# Patient Record
Sex: FEMALE | Race: WHITE | NOT HISPANIC OR LATINO | Employment: OTHER | URBAN - METROPOLITAN AREA
[De-identification: names, ages, dates, MRNs, and addresses within clinical notes are randomized per-mention and may not be internally consistent; named-entity substitution may affect disease eponyms.]

---

## 2017-03-06 ENCOUNTER — OFFICE VISIT (OUTPATIENT)
Dept: FAMILY MEDICINE | Facility: CLINIC | Age: 67
End: 2017-03-06
Payer: COMMERCIAL

## 2017-03-06 VITALS
DIASTOLIC BLOOD PRESSURE: 70 MMHG | WEIGHT: 152 LBS | SYSTOLIC BLOOD PRESSURE: 120 MMHG | OXYGEN SATURATION: 97 % | RESPIRATION RATE: 16 BRPM | HEART RATE: 69 BPM | BODY MASS INDEX: 25.95 KG/M2 | HEIGHT: 64 IN | TEMPERATURE: 98.3 F

## 2017-03-06 DIAGNOSIS — H81.11 BPPV (BENIGN PAROXYSMAL POSITIONAL VERTIGO), RIGHT: Primary | ICD-10-CM

## 2017-03-06 PROCEDURE — 99213 OFFICE O/P EST LOW 20 MIN: CPT | Performed by: FAMILY MEDICINE

## 2017-03-06 RX ORDER — MECLIZINE HYDROCHLORIDE 25 MG/1
25 TABLET ORAL EVERY 6 HOURS PRN
Qty: 60 TABLET | Refills: 1 | Status: SHIPPED | OUTPATIENT
Start: 2017-03-06 | End: 2017-05-23

## 2017-03-06 NOTE — PATIENT INSTRUCTIONS
Benign Positional Vertigo    The inner ear is located behind the middle ear. It is a part of the balance center of the body. It contains small calcium particles within fluid filled canals (semi-circular canals). These particles can move out of position as a result of aging, head trauma or disease of the inner ear. Once that happens, movement of the head into certain positions may cause the particles to stimulate the inner ear and create the feeling of vertigo.  Vertigo is a false feeling of motion (as if you or the room is spinning). A vertigo attack may cause sudden nausea, vomiting and heavy sweating. Severe vertigo causes a loss of balance and may result in falling. During an attack of vertigo, head movement and body position changes will worsen symptoms.  An episode of vertigo may last seconds, minutes or hours. Once you are over the first episode of vertigo, it may never return. Sometimes symptoms recur off and on over several weeks or longer.  Home Care:    If symptoms are severe, rest quietly in bed. Change positions slowly. There is usually one position that will feel best, such as lying on one side or lying on your back with your head slightly raised on pillows.    Do not drive or work with dangerous machinery for one week after symptoms disappear, in case of a sudden return of symptoms.    Take medicine as prescribed to relieve your symptoms. Unless another medicine was prescribed for nausea, vomiting and vertigo, you may use over-the-counter motion sickness pills, such as meclizine (Bonine, Bonamine, Antivert) or dimenhydrinate (Dramamine).  Follow Up  with your doctor or as directed by our staff. Report any persistent ringing in the ear or hearing loss to your doctor.  [NOTE: If you had a CT or MRI scan, it will be reviewed by a specialist. You will be notified of any new findings that may affect your care.]  Get Prompt Medical Attention  if any of the following occur:    Worsening of vertigo not  controlled by the medicine prescribed    Repeated vomiting not controlled by the medicine prescribed    Increased weakness or fainting    Severe headache or unusual drowsiness or confusion    Weakness of an arm or leg or one side of the face    Difficulty with speech or vision    Seizure    2898-0199 [employer]. [address] [place] [address]. All rights reserved. This information is not intended as a substitute for professional medical care. Always follow your healthcare professional's instructions.

## 2017-03-06 NOTE — PROGRESS NOTES
"  SUBJECTIVE:                                                    Amelia Ozuna is a 66 year old female who presents to clinic today for the following health issues:      Follow up on vertigo, dizziness has returned along with some nausea and blurred vision, started up about 2 weeks ago following a bout with cold symptoms. exercises are not helping. Also will be traveling soon and would like something to calm while on flight.  It happened one year ago and she got some exercises that helped.    It also happened about 3 years ago.   She does not have any ringing in her ears.   She does have some muffling in both ears.   She does NOT have pressure or pain in her ears.   She does NOT have family hx of this.   She is going to [place] in 1 week for a month.       Past Medical History   Diagnosis Date     Leiomyoma of uterus, unspecified 10/2000       Past Surgical History   Procedure Laterality Date     C nonspecific procedure       Tonsillectomy     C nonspecific procedure       Oophorectomy benign serous cystadenoma (L)     C ligate fallopian tube,postpartum  1981     Tubal Ligation       MEDICATIONS:  Current Outpatient Prescriptions   Medication     SESAME OIL     No current facility-administered medications for this visit.        SOCIAL HISTORY:  Social History   Substance Use Topics     Smoking status: Never Smoker     Smokeless tobacco: Never Used     Alcohol use No       Family History   Problem Relation Age of Onset     Alcohol/Drug Father      Alzheimer Disease Mother 92     Neurologic Disorder Sister      3-one sister with hydrocephalous     Family History Negative Brother      3     Breast Cancer No family hx of      Cancer - colorectal No family hx of        Objective:  Blood pressure 120/70, pulse 69, temperature 98.3  F (36.8  C), temperature source Oral, resp. rate 16, height 5' 4\" (1.626 m), weight 152 lb (68.9 kg), SpO2 97 %, not currently breastfeeding.  HEENT:  TM's are clear bilaterally.  Oropharynx " is clear without tonsillar hypertrophy or exudate.  Conjuctiva are clear.  Nystagmus with right gaze  Chest: Clear to auscultation bilaterally.  No wheezes, rales or retractions.  CV: Regular rate and rhythm without murmurs, rubs or gallops.  Neck:  There is no lymphadenopathy or thyroid tenderness or enlargement    Assessment:  1. BPPV - think it is right ear - do not think Meniere's    Plan:  1. Meclizine prn  2. Handout on the above diagnosis was given to the patient and discussed  3. Balance referral if not better when back from [place]  4. Still has exercises to try from one year ago

## 2017-03-06 NOTE — MR AVS SNAPSHOT
After Visit Summary   3/6/2017    Amelia Ozuna    MRN: [id number]           Patient Information     Date Of Birth          1950        Visit Information        Provider Department      3/6/2017 2:15 PM Deisi Ortiz MD [facility]        Today's Diagnoses     BPPV (benign paroxysmal positional vertigo), right    -  1      Care Instructions      Benign Positional Vertigo    The inner ear is located behind the middle ear. It is a part of the balance center of the body. It contains small calcium particles within fluid filled canals (semi-circular canals). These particles can move out of position as a result of aging, head trauma or disease of the inner ear. Once that happens, movement of the head into certain positions may cause the particles to stimulate the inner ear and create the feeling of vertigo.  Vertigo is a false feeling of motion (as if you or the room is spinning). A vertigo attack may cause sudden nausea, vomiting and heavy sweating. Severe vertigo causes a loss of balance and may result in falling. During an attack of vertigo, head movement and body position changes will worsen symptoms.  An episode of vertigo may last seconds, minutes or hours. Once you are over the first episode of vertigo, it may never return. Sometimes symptoms recur off and on over several weeks or longer.  Home Care:    If symptoms are severe, rest quietly in bed. Change positions slowly. There is usually one position that will feel best, such as lying on one side or lying on your back with your head slightly raised on pillows.    Do not drive or work with dangerous machinery for one week after symptoms disappear, in case of a sudden return of symptoms.    Take medicine as prescribed to relieve your symptoms. Unless another medicine was prescribed for nausea, vomiting and vertigo, you may use over-the-counter motion sickness pills, such as meclizine (Bonine, Bonamine, Antivert) or  dimenhydrinate (Dramamine).  Follow Up  with your doctor or as directed by our staff. Report any persistent ringing in the ear or hearing loss to your doctor.  [NOTE: If you had a CT or MRI scan, it will be reviewed by a specialist. You will be notified of any new findings that may affect your care.]  Get Prompt Medical Attention  if any of the following occur:    Worsening of vertigo not controlled by the medicine prescribed    Repeated vomiting not controlled by the medicine prescribed    Increased weakness or fainting    Severe headache or unusual drowsiness or confusion    Weakness of an arm or leg or one side of the face    Difficulty with speech or vision    Seizure    1852-5963 [employer]. [address] [place] [address]. All rights reserved. This information is not intended as a substitute for professional medical care. Always follow your healthcare professional's instructions.              Follow-ups after your visit        Additional Services     AUDIOLOGY BALANCE REFERRAL       Your provider has referred you to: [facility] [phone] [email or web address]    Audiology Balance Referral (Comprehensive) includes Videonystagmography (VNG), Rotational Chair testing, and Computerized Dynamic Posturography.  You may also select individual tests from the list below.    Procedure(s): Audiology Balance Referral Comprehensive (includes Videonystagmography (VNG), Rotational Chair Testing and Computerized Dynamic Posturography)                  Who to contact     If you have questions or need follow up information about today's clinic visit or your schedule please contact [facility] directly at [phone].  Normal or non-critical lab and imaging results will be communicated to you by MyChart, letter or phone within 4 business days after the clinic has received the results. If you do not hear from us within 7  "days, please contact the clinic through [employer] or phone. If you have a critical or abnormal lab result, we will notify you by phone as soon as possible.  Submit refill requests through [employer] or call your pharmacy and they will forward the refill request to us. Please allow 3 business days for your refill to be completed.          Additional Information About Your Visit        [employer] Information     [employer] lets you send messages to your doctor, view your test results, renew your prescriptions, schedule appointments and more. To sign up, go to [email or web address] . Click on \"Log in\" on the left side of the screen, which will take you to the Welcome page. Then click on \"Sign up Now\" on the right side of the page.     You will be asked to enter the access code listed below, as well as some personal information. Please follow the directions to create your username and password.     Your access code is: [email or web address]  Expires: 2017  3:04 PM     Your access code will  in 90 days. If you need help or a new code, please call your [place] clinic or [phone].        Care EveryWhere ID     This is your Care EveryWhere ID. This could be used by other organizations to access your [place] medical records  [id number]        Your Vitals Were     Pulse Temperature Respirations Height Pulse Oximetry BMI (Body Mass Index)    69 98.3  F (36.8  C) (Oral) 16 5' 4\" (1.626 m) 97% 26.09 kg/m2       Blood Pressure from Last 3 Encounters:   17 120/70   16 120/70   16 120/75    Weight from Last 3 Encounters:   17 152 lb (68.9 kg)   16 155 lb (70.3 kg)   16 159 lb (72.1 kg)              We Performed the Following     AUDIOLOGY BALANCE REFERRAL          Today's Medication Changes          These changes are accurate as of: 3/6/17  3:05 PM.  If you have any questions, ask your nurse or doctor.               Start taking these medicines.        Dose/Directions    meclizine 25 MG tablet "   Commonly known as:  ANTIVERT   Used for:  BPPV (benign paroxysmal positional vertigo), right   Started by:  Deisi Ortiz MD        Dose:  25 mg   Take 1 tablet (25 mg) by mouth every 6 hours as needed for dizziness   Quantity:  60 tablet   Refills:  1            Where to get your medicines      These medications were sent to [facility] [id number] [place] [address] [facility] [address] [address] [facility] [address]     Phone:  [phone]     meclizine 25 MG tablet                Primary Care Provider    None Specified       No primary provider on file.        Thank you!     Thank you for choosing [facility]  for your care. Our goal is always to provide you with excellent care. Hearing back from our patients is one way we can continue to improve our services. Please take a few minutes to complete the written survey that you may receive in the mail after your visit with us. Thank you!             Your Updated Medication List - Protect others around you: Learn how to safely use, store and throw away your medicines at www.disposemymeds.org.          This list is accurate as of: 3/6/17  3:05 PM.  Always use your most recent med list.                   Brand Name Dispense Instructions for use    meclizine 25 MG tablet    ANTIVERT    60 tablet    Take 1 tablet (25 mg) by mouth every 6 hours as needed for dizziness       SESAME OIL      Once in a day

## 2017-03-06 NOTE — NURSING NOTE
"Chief Complaint   Patient presents with     Dizziness       Initial /70 (BP Location: Right arm, Patient Position: Chair, Cuff Size: Adult Regular)  Pulse 69  Temp 98.3  F (36.8  C) (Oral)  Resp 16  Ht 5' 4\" (1.626 m)  Wt 152 lb (68.9 kg)  SpO2 97%  BMI 26.09 kg/m2 Estimated body mass index is 26.09 kg/(m^2) as calculated from the following:    Height as of this encounter: 5' 4\" (1.626 m).    Weight as of this encounter: 152 lb (68.9 kg).  Medication Reconciliation: complete.Kyle VASQUEZ MA      "

## 2017-05-23 ENCOUNTER — OFFICE VISIT (OUTPATIENT)
Dept: FAMILY MEDICINE | Facility: CLINIC | Age: 67
End: 2017-05-23
Payer: COMMERCIAL

## 2017-05-23 VITALS
DIASTOLIC BLOOD PRESSURE: 77 MMHG | SYSTOLIC BLOOD PRESSURE: 127 MMHG | WEIGHT: 152 LBS | OXYGEN SATURATION: 98 % | HEIGHT: 64 IN | TEMPERATURE: 98.6 F | HEART RATE: 75 BPM | RESPIRATION RATE: 16 BRPM | BODY MASS INDEX: 25.95 KG/M2

## 2017-05-23 DIAGNOSIS — K29.00 ACUTE GASTRITIS, PRESENCE OF BLEEDING UNSPECIFIED, UNSPECIFIED GASTRITIS TYPE: Primary | ICD-10-CM

## 2017-05-23 DIAGNOSIS — F33.8 SEASONAL AFFECTIVE DISORDER (H): ICD-10-CM

## 2017-05-23 PROCEDURE — 99213 OFFICE O/P EST LOW 20 MIN: CPT | Performed by: PHYSICIAN ASSISTANT

## 2017-05-23 RX ORDER — OMEPRAZOLE 40 MG/1
40 CAPSULE, DELAYED RELEASE ORAL DAILY
Qty: 30 CAPSULE | Refills: 0 | Status: SHIPPED | OUTPATIENT
Start: 2017-05-23 | End: 2018-06-01

## 2017-05-23 RX ORDER — BUPROPION HYDROCHLORIDE 150 MG/1
150 TABLET ORAL EVERY MORNING
Qty: 30 TABLET | Refills: 5 | Status: SHIPPED | OUTPATIENT
Start: 2017-05-23 | End: 2018-06-01

## 2017-05-23 NOTE — NURSING NOTE
"Chief Complaint   Patient presents with     Abdominal Pain     epigastric pain, patient states that anytime she takes medication she gets pain in epigastric region. Pain for the past month.       Initial /77 (BP Location: Right arm, Patient Position: Chair, Cuff Size: Adult Regular)  Pulse 75  Temp 98.6  F (37  C) (Oral)  Resp 16  Ht 5' 4\" (1.626 m)  Wt 152 lb (68.9 kg)  SpO2 98%  BMI 26.09 kg/m2 Estimated body mass index is 26.09 kg/(m^2) as calculated from the following:    Height as of this encounter: 5' 4\" (1.626 m).    Weight as of this encounter: 152 lb (68.9 kg).  Medication Reconciliation: complete     Ivy Mandel CMA      "

## 2017-05-23 NOTE — PROGRESS NOTES
SUBJECTIVE:                                                    Amelia Ozuna is a 66 year old female who presents to clinic today for the following health issues:      ABDOMINAL PAIN     Onset: month    Description:   Character: Sharp  Location: epigastric region  Radiation: None    Intensity: mild    Progression of Symptoms:  same    Accompanying Signs & Symptoms:  Fever/Chills?: no   Gas/Bloating: YES  Nausea: YES- but it has resolved  Vomitting: YES- but it has resolved  Diarrhea?: no   Constipation:no   Dysuria or Hematuria: no   Blood in stool: no   History:   Trauma: no   Previous similar pain: YES   Previous tests done: Upper Endoscopy  Symptoms started after taking otc nsaids after hurting back in [place].     Precipitating factors:   Does the pain change with:     Food: YES- sometimes, but not all the time     BM: no     Urination: no     Alleviating factors:  Pt reports pain is worse after taking any type of medications.    Therapies Tried and outcome: none    LMP:  not applicable       Of note, patient also has history of seasonal affective disordered managed with wellbutrin 150 mg daily during the winter months. She is requesting a refill of this. No side effects. Manages symptoms well.     Problem list and histories reviewed & adjusted, as indicated.  Additional history: as documented    Patient Active Problem List   Diagnosis     GERD (gastroesophageal reflux disease)     Hypercholesterolemia     HYPERLIPIDEMIA LDL GOAL <130     Advanced directives, counseling/discussion     Eyelid edema     Seasonal affective disorder (H)     Onychomycosis     Adjustment disorder with mixed emotional features     BPPV (benign paroxysmal positional vertigo)     Past Surgical History:   Procedure Laterality Date     C LIGATE FALLOPIAN TUBE,POSTPARTUM  1981    Tubal Ligation     C NONSPECIFIC PROCEDURE      Tonsillectomy     C NONSPECIFIC PROCEDURE      Oophorectomy benign serous cystadenoma (L)       Social History  "  Substance Use Topics     Smoking status: Never Smoker     Smokeless tobacco: Never Used     Alcohol use No     Family History   Problem Relation Age of Onset     Alcohol/Drug Father      Alzheimer Disease Mother 92     Neurologic Disorder Sister      3-one sister with hydrocephalous     Family History Negative Brother      3     Breast Cancer No family hx of      Cancer - colorectal No family hx of          Current Outpatient Prescriptions   Medication Sig Dispense Refill     omeprazole (PRILOSEC) 40 MG capsule Take 1 capsule (40 mg) by mouth daily Take 30-60 minutes before a meal. 30 capsule 0     buPROPion (WELLBUTRIN XL) 150 MG 24 hr tablet Take 1 tablet (150 mg) by mouth every morning 30 tablet 5     SESAME OIL Once in a day       Allergies   Allergen Reactions     No Known Drug Allergies      BP Readings from Last 3 Encounters:   05/23/17 127/77   03/06/17 120/70   12/05/16 120/70    Wt Readings from Last 3 Encounters:   05/23/17 152 lb (68.9 kg)   03/06/17 152 lb (68.9 kg)   12/05/16 155 lb (70.3 kg)                    Reviewed and updated as needed this visit by clinical staff  Tobacco  Allergies  Meds  Problems  Med Hx  Surg Hx  Fam Hx  Soc Hx        Reviewed and updated as needed this visit by Provider  Allergies  Meds  Problems         ROS:  Constitutional, HEENT, cardiovascular, pulmonary, gi and gu systems are negative, except as otherwise noted.    OBJECTIVE:                                                    /77 (BP Location: Right arm, Patient Position: Chair, Cuff Size: Adult Regular)  Pulse 75  Temp 98.6  F (37  C) (Oral)  Resp 16  Ht 5' 4\" (1.626 m)  Wt 152 lb (68.9 kg)  SpO2 98%  BMI 26.09 kg/m2  Body mass index is 26.09 kg/(m^2).  GENERAL: healthy, alert and no distress  RESP: lungs clear to auscultation - no rales, rhonchi or wheezes  CV: regular rates and rhythm, normal S1 S2, no S3 or S4 and no murmur, click or rub  ABDOMEN: tenderness epigastric, no organomegaly or " masses, liver span normal to percussion, bowel sounds normal and no palpable or pulsatile masses  PSYCH: mentation appears normal, affect normal/bright    Diagnostic Test Results:  none      ASSESSMENT/PLAN:                                                      (K29.00) Acute gastritis, presence of bleeding unspecified, unspecified gastritis type  (primary encounter diagnosis)  Comment: history of GERD. This with past normal EGD and recent nsaid exposure, likely benign gastritis. No history or exam findings suggesting significant PUD. No evidence of upper GI bleed at this time. h pylori was considered given recent travel, but with symptoms occurring after nsaid use, felt less likely. If symptoms fail to improve in 2 weeks, will obtain EGD given age. If worsening, patient should RTC.   Plan: omeprazole (PRILOSEC) 40 MG capsule        -Medication use and side effects discussed with the patient. Patient is in complete understanding and agreement with plan.       (F39) Seasonal affective disorder (H)  Comment: history of this. Requesting refill. Tolerates well. Takes only during winter. No side effects.   Plan: buPROPion (WELLBUTRIN XL) 150 MG 24 hr tablet        -Medication use and side effects discussed with the patient. Patient is in complete understanding and agreement with plan.         Follow up: as above     Calvin Fitzpatrick PA-C  [facility]

## 2017-05-23 NOTE — PATIENT INSTRUCTIONS
Gastritis (Adult)    Gastritis is inflammation and irritation of the stomach lining. It can be present for a short time (acute) or be long lasting (chronic). Gastritis is often caused by infection with bacteria called H pylori. More than a third of people in the [place] have this bacteria in their bodies. In many cases, H pylori causes no problems or symptoms. In some people, though, the infection irritates the stomach lining and causes gastritis. Other causes of stomach irritation include drinking alcohol or taking pain-relieving medicines called NSAIDs (such as aspirin or ibuprofen).   Symptoms of gastritis can include:    Abdominal pain or bloating    Loss of appetite    Nausea or vomiting    Vomiting blood or having black stools    Feeling more tired than usual  An inflamed and irritated stomach lining is more likely to develop a sore called an ulcer. To help prevent this, gastritis should be treated.  Home care  If needed, medicines may be prescribed. If you have H pylori infection, treating it will likely relieve your symptoms. Other changes can help reduce stomach irritation and help it heal.    If you have been prescribed medicines for H pylori infection, take them as directed. Take all of the medicine until it is finished or your healthcare provider tells you to stop, even if you feel better.    Your healthcare provider may recommend avoiding NSAIDs. If you take daily aspirin for your heart or other medical reasons, do not stop without talking to your healthcare provider first.    Avoid drinking alcohol.    Stop smoking. Smoking can irritate the stomach and delay healing. As much as possible, stay away from second hand smoke.  Follow-up care  Follow up with your healthcare provider, or as advised by our staff. Testing may be needed to check for inflammation or an ulcer.  When to seek medical advice  Call your healthcare provider for any of the following:    Stomach pain that gets worse or moves to the lower  right abdomen (appendix area)    Chest pain that appears or gets worse, or spreads to the back, neck, shoulder, or arm    Frequent vomiting (can t keep down liquids)    Blood in the stool or vomit (red or black in color)    Feeling weak or dizzy    Fever of 100.4 F (38 C) or higher, or as directed by your healthcare provider    9227-0206 [employer]. [address] [place] [address]. All rights reserved. This information is not intended as a substitute for professional medical care. Always follow your healthcare professional's instructions.

## 2017-05-23 NOTE — MR AVS SNAPSHOT
After Visit Summary   5/23/2017    Amelia Ozuna    MRN: [id number]           Patient Information     Date Of Birth          1950        Visit Information        Provider Department      5/23/2017 11:00 AM Calvin Fitzpatrick PA-C [facility]        Today's Diagnoses     Acute gastritis, presence of bleeding unspecified, unspecified gastritis type    -  1      Care Instructions      Gastritis (Adult)    Gastritis is inflammation and irritation of the stomach lining. It can be present for a short time (acute) or be long lasting (chronic). Gastritis is often caused by infection with bacteria called H pylori. More than a third of people in the  have this bacteria in their bodies. In many cases, H pylori causes no problems or symptoms. In some people, though, the infection irritates the stomach lining and causes gastritis. Other causes of stomach irritation include drinking alcohol or taking pain-relieving medicines called NSAIDs (such as aspirin or ibuprofen).   Symptoms of gastritis can include:    Abdominal pain or bloating    Loss of appetite    Nausea or vomiting    Vomiting blood or having black stools    Feeling more tired than usual  An inflamed and irritated stomach lining is more likely to develop a sore called an ulcer. To help prevent this, gastritis should be treated.  Home care  If needed, medicines may be prescribed. If you have H pylori infection, treating it will likely relieve your symptoms. Other changes can help reduce stomach irritation and help it heal.    If you have been prescribed medicines for H pylori infection, take them as directed. Take all of the medicine until it is finished or your healthcare provider tells you to stop, even if you feel better.    Your healthcare provider may recommend avoiding NSAIDs. If you take daily aspirin for your heart or other medical reasons, do not stop without talking to your healthcare provider first.    Avoid  drinking alcohol.    Stop smoking. Smoking can irritate the stomach and delay healing. As much as possible, stay away from second hand smoke.  Follow-up care  Follow up with your healthcare provider, or as advised by our staff. Testing may be needed to check for inflammation or an ulcer.  When to seek medical advice  Call your healthcare provider for any of the following:    Stomach pain that gets worse or moves to the lower right abdomen (appendix area)    Chest pain that appears or gets worse, or spreads to the back, neck, shoulder, or arm    Frequent vomiting (can t keep down liquids)    Blood in the stool or vomit (red or black in color)    Feeling weak or dizzy    Fever of 100.4 F (38 C) or higher, or as directed by your healthcare provider    5927-0933 [employer] [address] [place] [address]. All rights reserved. This information is not intended as a substitute for professional medical care. Always follow your healthcare professional's instructions.              Follow-ups after your visit        Who to contact     If you have questions or need follow up information about today's clinic visit or your schedule please contact [facility] directly at [phone].  Normal or non-critical lab and imaging results will be communicated to you by [employer]hart, letter or phone within 4 business days after the clinic has received the results. If you do not hear from us within 7 days, please contact the clinic through [employer]hart or phone. If you have a critical or abnormal lab result, we will notify you by phone as soon as possible.  Submit refill requests through [employer] or call your pharmacy and they will forward the refill request to us. Please allow 3 business days for your refill to be completed.          Additional Information About Your Visit        [employer]hart Information     [employer] lets you send messages to your doctor, view your test results, renew your prescriptions,  "schedule appointments and more. To sign up, go to [email or web address] . Click on \"Log in\" on the left side of the screen, which will take you to the Welcome page. Then click on \"Sign up Now\" on the right side of the page.     You will be asked to enter the access code listed below, as well as some personal information. Please follow the directions to create your username and password.     Your access code is: [email or web address]  Expires: 2017  4:04 PM     Your access code will  in 90 days. If you need help or a new code, please call your [place] clinic or [phone].        Care EveryWhere ID     This is your Care EveryWhere ID. This could be used by other organizations to access your [place] medical records  [id number]        Your Vitals Were     Pulse Temperature Respirations Height Pulse Oximetry BMI (Body Mass Index)    75 98.6  F (37  C) (Oral) 16 5' 4\" (1.626 m) 98% 26.09 kg/m2       Blood Pressure from Last 3 Encounters:   17 127/77   17 120/70   16 120/70    Weight from Last 3 Encounters:   17 152 lb (68.9 kg)   17 152 lb (68.9 kg)   16 155 lb (70.3 kg)              Today, you had the following     No orders found for display         Today's Medication Changes          These changes are accurate as of: 17 11:16 AM.  If you have any questions, ask your nurse or doctor.               Start taking these medicines.        Dose/Directions    omeprazole 40 MG capsule   Commonly known as:  priLOSEC   Used for:  Acute gastritis, presence of bleeding unspecified, unspecified gastritis type   Started by:  Calvin Fitzpatrick PA-C        Dose:  40 mg   Take 1 capsule (40 mg) by mouth daily Take 30-60 minutes before a meal.   Quantity:  30 capsule   Refills:  0            Where to get your medicines      Some of these will need a paper prescription and others can be bought over the counter.  Ask your nurse if you have questions.     Bring a paper prescription for " each of these medications     omeprazole 40 MG capsule                Primary Care Provider    None Specified       No primary provider on file.        Thank you!     Thank you for choosing [facility]  for your care. Our goal is always to provide you with excellent care. Hearing back from our patients is one way we can continue to improve our services. Please take a few minutes to complete the written survey that you may receive in the mail after your visit with us. Thank you!             Your Updated Medication List - Protect others around you: Learn how to safely use, store and throw away your medicines at www.disposemymeds.org.          This list is accurate as of: 5/23/17 11:16 AM.  Always use your most recent med list.                   Brand Name Dispense Instructions for use    omeprazole 40 MG capsule    priLOSEC    30 capsule    Take 1 capsule (40 mg) by mouth daily Take 30-60 minutes before a meal.       SESAME OIL      Once in a day

## 2017-09-11 ENCOUNTER — TRANSFERRED RECORDS (OUTPATIENT)
Dept: HEALTH INFORMATION MANAGEMENT | Facility: CLINIC | Age: 67
End: 2017-09-11

## 2017-11-24 ENCOUNTER — OFFICE VISIT (OUTPATIENT)
Dept: PODIATRY | Facility: CLINIC | Age: 67
End: 2017-11-24
Payer: COMMERCIAL

## 2017-11-24 VITALS
WEIGHT: 152 LBS | BODY MASS INDEX: 25.95 KG/M2 | SYSTOLIC BLOOD PRESSURE: 122 MMHG | DIASTOLIC BLOOD PRESSURE: 72 MMHG | HEIGHT: 64 IN

## 2017-11-24 DIAGNOSIS — L60.0 ONYCHOCRYPTOSIS: Primary | ICD-10-CM

## 2017-11-24 DIAGNOSIS — B35.1 ONYCHOMYCOSIS: ICD-10-CM

## 2017-11-24 PROCEDURE — 11730 AVULSION NAIL PLATE SIMPLE 1: CPT | Mod: T5 | Performed by: PODIATRIST

## 2017-11-24 PROCEDURE — 99203 OFFICE O/P NEW LOW 30 MIN: CPT | Mod: 25 | Performed by: PODIATRIST

## 2017-11-24 NOTE — PATIENT INSTRUCTIONS
Thank you for choosing [facility]!    DR. DELONG'S CLINIC LOCATIONS:   MONDAY - [place] TUESDAY - [place]   [address] [facility]  [address] [facility] [address]   [place] [address] [place] [address]   [phone]       THURSDAY AM - [place] THURSDAY PM - [place]   [address]   [place] [address] [place] [address]   [phone]       FRIDAY AM - [place] SET UP SURGERY: [phone] [address] APPOINTMENTS: [phone]   [place] [address] BILLING QUESTIONS: [phone] FAX NUMBER: [phone]     [facility] TOENAIL REMOVAL HOME INSTRUCTIONS  1. After the procedure, go home and elevate the foot/feet for the remainder of the day/evening as able. This is to minimize swelling, control pain, and limit post-procedural complications. The pre-procedural injection may cause your toe to be numb anywhere from 1-2 hours.    2. You can take Tylenol, Ibuprofen, Advil, etc as needed for pain if tolerated. Follow label instructions.     3. If you have been given a prescription for antibiotics, take them as instructed and complete the entire prescription.    4. Keep dressing intact until the following morning. Then remove the bandage (you may need to soak it in warm soapy water as the bandage will likely adhere to your skin).    5. Start soaking in warm soapy water for 5-10 minutes twice a day. Wash the toe thoroughly, dry the toe thoroughly. Apply antibiotic wound ointment to base of wound and cover with gauze and Coban dressing (not too tightly) until it stops draining. This may take a few days to weeks, but at that point, you may continue with antibiotic ointment and a band-aid, or you may stop applying a dressing all together. Dressing changes should be done twice daily if you had the permanent/chemical procedure done.    6. You may do activities as tolerated the following day. Find a shoe that is  comfortable and minimizes the amount of rubbing on your toe, as this may increase pain, swelling, etc.    7. Monitor for signs of infection. With this procedure, it is common to have mild surrounding redness and drainage. If the redness involves the entire great toe or if you notice red streaks on top of your foot, or if you experience any nausea, vomiting, chills, fevers > 101 degrees, call clinic for a quick appointment.        Body Mass Index (BMI)  Many things can cause foot and ankle problems. Foot structure, activity level, foot mechanics and injuries are common causes of pain.  One very important issue that often goes unmentioned, is body weight.  Extra weight can cause increased stress on muscles, ligaments, bones and tendons.  Sometimes just a few extra pounds is all it takes to put one over her/his threshold. Without reducing that stress, it can be difficult to alleviate pain. Some people are uncomfortable addressing this issue, but we feel it is important for you to think about it. As Foot &  Ankle specialists, our job is addressing the lower extremity problem and possible causes. Regarding extra body weight, we encourage patients to discuss diet and weight management plans with their primary care doctors. It is this team approach that gives you the best opportunity for pain relief and getting you back on your feet.

## 2017-11-24 NOTE — MR AVS SNAPSHOT
After Visit Summary   11/24/2017    Amelia Ozuna    MRN: [id number]           Patient Information     Date Of Birth          1950        Visit Information        Provider Department      11/24/2017 10:00 AM Jaden Delong DPM [facility]        Care Instructions    Thank you for choosing [facility]!    DR. DELONG'S CLINIC LOCATIONS:   MONDAY - [place] TUESDAY - [place]   [address] [facility]  [address]   [place] [address] [place] [address]   [phone]       THURSDAY AM - [place] THURSDAY PM - [place]   [address]   [place] [address] [place] [address]   [phone]       FRIDAY AM - [place] SET UP SURGERY: [phone] [address] APPOINTMENTS: [phone]   [place] [address] BILLING QUESTIONS: [phone] FAX NUMBER: [phone]     INGROWN TOENAIL REMOVAL HOME INSTRUCTIONS  1. After the procedure, go home and elevate the foot/feet for the remainder of the day/evening as able. This is to minimize swelling, control pain, and limit post-procedural complications. The pre-procedural injection may cause your toe to be numb anywhere from 1-2 hours.    2. You can take Tylenol, Ibuprofen, Advil, etc as needed for pain if tolerated. Follow label instructions.     3. If you have been given a prescription for antibiotics, take them as instructed and complete the entire prescription.    4. Keep dressing intact until the following morning. Then remove the bandage (you may need to soak it in warm soapy water as the bandage will likely adhere to your skin).    5. Start soaking in warm soapy water for 5-10 minutes twice a day. Wash the toe thoroughly, dry the toe thoroughly. Apply antibiotic wound ointment to base of wound and cover with gauze and Coban dressing (not too tightly) until it stops draining. This may take a few days to  weeks, but at that point, you may continue with antibiotic ointment and a band-aid, or you may stop applying a dressing all together. Dressing changes should be done twice daily if you had the permanent/chemical procedure done.    6. You may do activities as tolerated the following day. Find a shoe that is comfortable and minimizes the amount of rubbing on your toe, as this may increase pain, swelling, etc.    7. Monitor for signs of infection. With this procedure, it is common to have mild surrounding redness and drainage. If the redness involves the entire great toe or if you notice red streaks on top of your foot, or if you experience any nausea, vomiting, chills, fevers > 101 degrees, call clinic for a quick appointment.        Body Mass Index (BMI)  Many things can cause foot and ankle problems. Foot structure, activity level, foot mechanics and injuries are common causes of pain.  One very important issue that often goes unmentioned, is body weight.  Extra weight can cause increased stress on muscles, ligaments, bones and tendons.  Sometimes just a few extra pounds is all it takes to put one over her/his threshold. Without reducing that stress, it can be difficult to alleviate pain. Some people are uncomfortable addressing this issue, but we feel it is important for you to think about it. As Foot &  Ankle specialists, our job is addressing the lower extremity problem and possible causes. Regarding extra body weight, we encourage patients to discuss diet and weight management plans with their primary care doctors. It is this team approach that gives you the best opportunity for pain relief and getting you back on your feet.              Follow-ups after your visit        Who to contact     If you have questions or need follow up information about today's clinic visit or your schedule please contact [facility] directly at [phone].  Normal or non-critical lab and imaging results will be  "communicated to you by MyChart, letter or phone within 4 business days after the clinic has received the results. If you do not hear from us within 7 days, please contact the clinic through [employer] or phone. If you have a critical or abnormal lab result, we will notify you by phone as soon as possible.  Submit refill requests through [employer] or call your pharmacy and they will forward the refill request to us. Please allow 3 business days for your refill to be completed.          Additional Information About Your Visit        [employer] Information     [employer] lets you send messages to your doctor, view your test results, renew your prescriptions, schedule appointments and more. To sign up, go to [email or web address] . Click on \"Log in\" on the left side of the screen, which will take you to the Welcome page. Then click on \"Sign up Now\" on the right side of the page.     You will be asked to enter the access code listed below, as well as some personal information. Please follow the directions to create your username and password.     Your access code is: [email or web address]  Expires: 2018 10:20 AM     Your access code will  in 90 days. If you need help or a new code, please call your [place] clinic or [phone].        Care EveryWhere ID     This is your Care EveryWhere ID. This could be used by other organizations to access your [place] medical records  [id number]        Your Vitals Were     Height BMI (Body Mass Index)                5' 4\" (1.626 m) 26.09 kg/m2           Blood Pressure from Last 3 Encounters:   17 127/77   17 120/70   16 120/70    Weight from Last 3 Encounters:   17 152 lb (68.9 kg)   17 152 lb (68.9 kg)   17 152 lb (68.9 kg)              Today, you had the following     No orders found for display       Primary Care Provider    None Specified       No primary provider on file.        Equal Access to Services     ALFONSO GARRISON AH: Sara blanco " [place], gautam ludinorahadaha, qarosata kafreida thao, eliana shanein hayaan aglindojose vijayamanda laAviavflorencio wilian. So [facility] [phone].    ATENCIÓN: Si irinala mimi, tiene a allan disposición servicios gratuitos de asistencia lingüística. Joshua al [phone].    We comply with applicable federal civil rights laws and [place] laws. We do not discriminate on the basis of race, color, national origin, age, disability, sex, sexual orientation, or gender identity.            Thank you!     Thank you for choosing [facility]  for your care. Our goal is always to provide you with excellent care. Hearing back from our patients is one way we can continue to improve our services. Please take a few minutes to complete the written survey that you may receive in the mail after your visit with us. Thank you!             Your Updated Medication List - Protect others around you: Learn how to safely use, store and throw away your medicines at www.disposemymeds.org.          This list is accurate as of: 11/24/17 10:20 AM.  Always use your most recent med list.                   Brand Name Dispense Instructions for use Diagnosis    buPROPion 150 MG 24 hr tablet    WELLBUTRIN XL    30 tablet    Take 1 tablet (150 mg) by mouth every morning    Seasonal affective disorder (H)       omeprazole 40 MG capsule    priLOSEC    30 capsule    Take 1 capsule (40 mg) by mouth daily Take 30-60 minutes before a meal.    Acute gastritis, presence of bleeding unspecified, unspecified gastritis type       SESAME OIL      Once in a day

## 2017-11-24 NOTE — NURSING NOTE
"Chief Complaint   Patient presents with     Ingrown Toenail     R hallux BL edges x years       Initial /72  Ht 5' 4\" (1.626 m)  Wt 152 lb (68.9 kg)  BMI 26.09 kg/m2 Estimated body mass index is 26.09 kg/(m^2) as calculated from the following:    Height as of this encounter: 5' 4\" (1.626 m).    Weight as of this encounter: 152 lb (68.9 kg).  Medication Reconciliation: complete    Fidelia Brooks CMA (AAMA)  Podiatry / Foot & Ankle Surgery  [facility]    "

## 2017-11-24 NOTE — PROGRESS NOTES
"Foot & Ankle Surgery  November 24, 2017    CC: R hallux pain    I was asked to see Amelia Ozuna regarding the chief complaint by:  self    HPI:  Pt is a 67 year old female who presents with above complaint.  R hallux nail pain x \"few\" years.  Does not recall injury.  Pain 3-6/10 daily, worse with shoe gear pressure.  No treatment noted.  Describes shooting pain.  Thinks she may have a fungus and that the fungus is causing the issue.    ROS:   Pos for CC.  The patient denies current nausea, vomiting, chills, fevers, belly pain, calf pain, chest pain or SOB.  Complete remainder of ROS is otherwise neg.    VITALS:  There were no vitals filed for this visit.    PMH:    Past Medical History:   Diagnosis Date     Leiomyoma of uterus, unspecified 10/2000       SXHX:    Past Surgical History:   Procedure Laterality Date     C LIGATE FALLOPIAN TUBE,POSTPARTUM  1981    Tubal Ligation     C NONSPECIFIC PROCEDURE      Tonsillectomy     C NONSPECIFIC PROCEDURE      Oophorectomy benign serous cystadenoma (L)        MEDS:    Current Outpatient Prescriptions   Medication     omeprazole (PRILOSEC) 40 MG capsule     buPROPion (WELLBUTRIN XL) 150 MG 24 hr tablet     SESAME OIL     No current facility-administered medications for this visit.        ALL:     Allergies   Allergen Reactions     No Known Drug Allergies        FMH:    Family History   Problem Relation Age of Onset     Alcohol/Drug Father      Alzheimer Disease Mother 92     Neurologic Disorder Sister      3-one sister with hydrocephalous     Family History Negative Brother      3     Breast Cancer No family hx of      Cancer - colorectal No family hx of        SocHx:    Social History     Social History     Marital status:      Spouse name: N/A     Number of children: N/A     Years of education: N/A     Occupational History     Not on file.     Social History Main Topics     Smoking status: Never Smoker     Smokeless tobacco: Never Used     Alcohol use No     " "Drug use: No     Sexual activity: Yes     Partners: Male     Birth control/ protection: Surgical      Comment: menopausal      Other Topics Concern     Parent/Sibling W/ Cabg, Mi Or Angioplasty Before 65f 55m? No     Social History Narrative           EXAMINATION:  Gen:   No apparent distress  Neuro:   A&Ox3, no deficits  Psych:    Answering questions appropriately for age and situation with normal affect  Head:    NCAT  Eye:    Visual scanning without deficit  Ear:    Response to auditory stimuli wnl  Lung:    Non-labored breathing on RA noted  Abd:    NTND per patient report  Lymph:    Neg for pitting/non-pitting edema BLE  Vasc:    Pulses palpable, CFT minimally delayed  Neuro:    Light touch sensation intact to all sensory nerve distributions without paresthesias  Derm:   R hallux nail quite incurvated latera > medial border without paronychia/infection.  Centrally, the nail is thickened, dystrophic, mycotic, although there is \"normal\" nail proximally, so hopefully the infection has not spread to the nail matrix.  MSK:    ROM, strength wnl without limitation, no pain on palpation noted.  Calf:    Neg for redness, swelling or tenderness    Assessment:  67 year old female with onychocryptosis bilateral border R hallux nail; onychomycosis R hallux      Plan:  Discussed etiologies and options  1.  Onychocryptosis bilateral border R hallux  -Regarding the ingrown nail, procedure options were discussed.  They elected to go with Total temporary avulsion.  See procedure note for details.  Risks that were discussed include but are not limited to infection, wound healing complications, nerve irritation, recurrence of the ingrown nail and the need for further procedures.  Follow up 2 weeks for re-evaluation or sooner with acute issues.  Antibiotic:  None needed  -discussed that the nail is very likely to grow back the same way. However, she would like to try temporary avulsion to try to save the nail.    After discussing " the procedure, as well as risks, complications and post-procedure instructions, informed consent was obtained.    Anesthesia:  4 cc's of  1% lidocaine plain    Procedure:  After adequate prep, and with anesthesia achieved,  attention was directed to the R hallux where the nail plate was freed from surrounding soft tissue and then removed in total.  The base of the wound was explored and showed no necrotic tissue, purulence or debris.   A clean dressing was applied loosely to prevent vascular insult.  The patient tolerated the procedure well without complications.    Post-procedural instructions were dispensed and discussed with the patient.  All questions were answered.         2.  Onychomycosis   -discussed that in many cases, the fungus may return. However, if there is proximal clear nail, and the matrix is not infected, we may see clearing of the fungus with a temporary avulsion    Follow up:  Prn after procedure or sooner with acute issues      Patient's medical history was reviewed today    Body mass index is 26.09 kg/(m^2).              Jaden Ayala DPM   Podiatric Foot & Ankle Surgeon  [facility]  [phone]

## 2018-06-01 ENCOUNTER — OFFICE VISIT (OUTPATIENT)
Dept: FAMILY MEDICINE | Facility: CLINIC | Age: 68
End: 2018-06-01
Payer: COMMERCIAL

## 2018-06-01 VITALS
SYSTOLIC BLOOD PRESSURE: 120 MMHG | RESPIRATION RATE: 16 BRPM | TEMPERATURE: 99 F | HEART RATE: 75 BPM | HEIGHT: 64 IN | DIASTOLIC BLOOD PRESSURE: 80 MMHG | OXYGEN SATURATION: 97 % | WEIGHT: 152.7 LBS | BODY MASS INDEX: 26.07 KG/M2

## 2018-06-01 DIAGNOSIS — Z78.0 ASYMPTOMATIC POSTMENOPAUSAL STATUS: ICD-10-CM

## 2018-06-01 DIAGNOSIS — Z11.59 NEED FOR HEPATITIS C SCREENING TEST: ICD-10-CM

## 2018-06-01 DIAGNOSIS — Z12.11 SCREEN FOR COLON CANCER: ICD-10-CM

## 2018-06-01 DIAGNOSIS — Z00.00 ROUTINE GENERAL MEDICAL EXAMINATION AT A HEALTH CARE FACILITY: Primary | ICD-10-CM

## 2018-06-01 DIAGNOSIS — E78.00 HYPERCHOLESTEROLEMIA: ICD-10-CM

## 2018-06-01 DIAGNOSIS — L82.1 SEBORRHEIC KERATOSES: ICD-10-CM

## 2018-06-01 DIAGNOSIS — Z12.31 VISIT FOR SCREENING MAMMOGRAM: ICD-10-CM

## 2018-06-01 LAB
CHOLEST SERPL-MCNC: 209 MG/DL
HBA1C MFR BLD: 5.2 % (ref 0–5.6)
HDLC SERPL-MCNC: 43 MG/DL
LDLC SERPL CALC-MCNC: 141 MG/DL
NONHDLC SERPL-MCNC: 166 MG/DL
TRIGL SERPL-MCNC: 125 MG/DL

## 2018-06-01 PROCEDURE — 80061 LIPID PANEL: CPT | Performed by: FAMILY MEDICINE

## 2018-06-01 PROCEDURE — G0438 PPPS, INITIAL VISIT: HCPCS | Performed by: FAMILY MEDICINE

## 2018-06-01 PROCEDURE — 83036 HEMOGLOBIN GLYCOSYLATED A1C: CPT | Performed by: FAMILY MEDICINE

## 2018-06-01 PROCEDURE — 36415 COLL VENOUS BLD VENIPUNCTURE: CPT | Performed by: FAMILY MEDICINE

## 2018-06-01 PROCEDURE — 86803 HEPATITIS C AB TEST: CPT | Performed by: FAMILY MEDICINE

## 2018-06-01 ASSESSMENT — ACTIVITIES OF DAILY LIVING (ADL)
CURRENT_FUNCTION: NO ASSISTANCE NEEDED
I_NEED_ASSISTANCE_FOR_THE_FOLLOWING_DAILY_ACTIVITIES:: NO ASSISTANCE IS NEEDED

## 2018-06-01 NOTE — NURSING NOTE
"Chief Complaint   Patient presents with     Physical       Initial There were no vitals taken for this visit. Estimated body mass index is 26.09 kg/(m^2) as calculated from the following:    Height as of 11/24/17: 5' 4\" (1.626 m).    Weight as of 11/24/17: 152 lb (68.9 kg).  Medication Reconciliation: complete      Health Maintenance addressed:  Mammogram and Colonoscopy/FIT    N/a    MARK Parson        "

## 2018-06-01 NOTE — MR AVS SNAPSHOT
After Visit Summary   6/1/2018    Amelia Ozuna    MRN: [id number]           Patient Information     Date Of Birth          1950        Visit Information        Provider Department      6/1/2018 9:20 AM Kasandra Strauss MD [facility]        Today's Diagnoses     Routine general medical examination at a health care facility    -  1    Screen for colon cancer        Asymptomatic postmenopausal status        Visit for screening mammogram        Need for hepatitis C screening test        Hypercholesterolemia          Care Instructions      Preventive Health Recommendations  Female Ages 65 +    Yearly exam:     See your health care provider every year in order to  o Review health changes.   o Discuss preventive care.    o Review your medicines if your doctor has prescribed any.      You no longer need a yearly Pap test unless you've had an abnormal Pap test in the past 10 years. If you have vaginal symptoms, such as bleeding or discharge, be sure to talk with your provider about a Pap test.      Every 1 to 2 years, have a mammogram.  If you are over 69, talk with your health care provider about whether or not you want to continue having screening mammograms.      Every 10 years, have a colonoscopy. Or, have a yearly FIT test (stool test). These exams will check for colon cancer.       Have a cholesterol test every 5 years, or more often if your doctor advises it.       Have a diabetes test (fasting glucose) every three years. If you are at risk for diabetes, you should have this test more often.       At age 65, have a bone density scan (DEXA) to check for osteoporosis (brittle bone disease).    Shots:    Get a flu shot each year.    Get a tetanus shot every 10 years.    Talk to your doctor about your pneumonia vaccines. There are now two you should receive - Pneumovax (PPSV 23) and Prevnar (PCV 13).    Talk to your doctor about the shingles vaccine.    Talk to your doctor about  the hepatitis B vaccine.    Nutrition:     Eat at least 5 servings of fruits and vegetables each day.      Eat whole-grain bread, whole-wheat pasta and brown rice instead of white grains and rice.      Talk to your provider about Calcium and Vitamin D.     Lifestyle    Exercise at least 150 minutes a week (30 minutes a day, 5 days a week). This will help you control your weight and prevent disease.      Limit alcohol to one drink per day.      No smoking.       Wear sunscreen to prevent skin cancer.       See your dentist twice a year for an exam and cleaning.      See your eye doctor every 1 to 2 years to screen for conditions such as glaucoma, macular degeneration, cataracts, etc           Follow-ups after your visit        Additional Services     GASTROENTEROLOGY ADULT REF PROCEDURE ONLY       Last Lab Result: Creatinine (mg/dL)       Date                     Value                 12/05/2016               0.75             ----------  There is no height or weight on file to calculate BMI.     Needed:  No  Language:  English    Patient will be contacted to schedule procedure.     Please be aware that coverage of these services is subject to the terms and limitations of your health insurance plan.  Call member services at your health plan with any benefit or coverage questions.  Any procedures must be performed at a [facility] facility OR coordinated by your clinic's referral office.    Please bring the following with you to your appointment:    (1) Any X-Rays, CTs or MRIs which have been performed.  Contact the facility where they were done to arrange for  prior to your scheduled appointment.    (2) List of current medications   (3) This referral request   (4) Any documents/labs given to you for this referral                  Future tests that were ordered for you today     Open Future Orders        Priority Expected Expires Ordered    DEXA HIP/PELVIS/SPINE - Future Routine  6/1/2019 6/1/2018    MA  "SCREENING DIGITAL BILAT - Future  (s+30) Routine  2019            Who to contact     If you have questions or need follow up information about today's clinic visit or your schedule please contact [facility] directly at [phone].  Normal or non-critical lab and imaging results will be communicated to you by MyChart, letter or phone within 4 business days after the clinic has received the results. If you do not hear from us within 7 days, please contact the clinic through MyChart or phone. If you have a critical or abnormal lab result, we will notify you by phone as soon as possible.  Submit refill requests through [employer] or call your pharmacy and they will forward the refill request to us. Please allow 3 business days for your refill to be completed.          Additional Information About Your Visit        MyChart Information     Netac lets you send messages to your doctor, view your test results, renew your prescriptions, schedule appointments and more. To sign up, go to [email or web address] . Click on \"Log in\" on the left side of the screen, which will take you to the Welcome page. Then click on \"Sign up Now\" on the right side of the page.     You will be asked to enter the access code listed below, as well as some personal information. Please follow the directions to create your username and password.     Your access code is: [email or web address]  Expires: 2018  9:57 AM     Your access code will  in 90 days. If you need help or a new code, please call your [place] clinic or [phone].        Care EveryWhere ID     This is your Care EveryWhere ID. This could be used by other organizations to access your [place] medical records  [id number]        Your Vitals Were     Pulse Temperature Respirations Height Pulse Oximetry Breastfeeding?    75 99  F (37.2  C) (Oral) 16 5' 4\" (1.626 m) 97% No    BMI (Body Mass Index)                   26.21 kg/m2            Blood Pressure " from Last 3 Encounters:   06/01/18 120/80   11/24/17 122/72   05/23/17 127/77    Weight from Last 3 Encounters:   06/01/18 152 lb 11.2 oz (69.3 kg)   11/24/17 152 lb (68.9 kg)   05/23/17 152 lb (68.9 kg)              We Performed the Following     GASTROENTEROLOGY ADULT REF PROCEDURE ONLY     Hemoglobin A1c     Hepatitis C Screen Reflex to HCV RNA Quant and Genotype     Lipid panel reflex to direct LDL Fasting          Today's Medication Changes          These changes are accurate as of 6/1/18  9:57 AM.  If you have any questions, ask your nurse or doctor.               Stop taking these medicines if you haven't already. Please contact your care team if you have questions.     buPROPion 150 MG 24 hr tablet   Commonly known as:  WELLBUTRIN XL   Stopped by:  Kasandra Strauss MD           omeprazole 40 MG capsule   Commonly known as:  priLOSEC   Stopped by:  Kasandra Strauss MD                    Primary Care Provider Office Phone # Fax #    [facility] [phone] [address] [address] [address]        Equal Access to Services     MIKE [facility] GulfportCHRISTINA AH: Hadii aad ku hadasho Soomaali, waaxda luqadaha, qaybta kaalmada adeegyada, eliana ramos hayflorencio chapman . So [facility] [phone].    ATENCIÓN: Si habla español, tiene a allan disposición servicios gratuitos de asistencia lingüística. [facility] [phone].    We comply with applicable federal civil rights laws and [place] laws. We do not discriminate on the basis of race, color, national origin, age, disability, sex, sexual orientation, or gender identity.            Thank you!     Thank you for choosing [facility]  for your care. Our goal is always to provide you with excellent care. Hearing back from our patients is one way we can continue to improve our services. Please take a few minutes to complete the written survey that you may receive in the mail after your visit with us. Thank you!              Your Updated Medication List - Protect others around you: Learn how to safely use, store and throw away your medicines at www.disposemymeds.org.          This list is accurate as of 6/1/18  9:57 AM.  Always use your most recent med list.                   Brand Name Dispense Instructions for use Diagnosis    SESAME OIL      Once in a day

## 2018-06-01 NOTE — LETTER
"                           Amelia ALEK Ozuna  [address] [address] [facility] [address]        Dear Amelia,      our recent lab work is back.     Your hepatitis C screen was negative.     Your diabetes screen was negative.     Your cholesterol has improved since last year, although still a bit high. Please see the suggestions for lowering cholesterol below.     What lifestyle changes can I make to help improve my cholesterol levels?     Exercise regularly.   Exercise can raise HDL cholesterol levels and reduce levels of LDL cholesterol and triglycerides. If you haven't been exercising, try to work up to 30 minutes, 4 to 6 times a week.     Lose weight if you are overweight.   Being overweight can raise your cholesterol levels. Losing weight, even just 5 or 10 pounds, can lower your total cholesterol, LDL cholesterol and triglyceride levels.     Eat a heart-healthy diet.   Eat plenty of fresh fruits and vegetables. Fruits and vegetables are naturally low in fat. Not only do they add flavor and variety to your diet, but they are also the best source of fiber, vitamins and minerals for your body. Aim for 5 cups of fruits and vegetables every day, not counting potatoes, corn and rice. Potatoes, corn and rice count as carbohydrates.     Pick \"good\" fats over \"bad\" fats. Fat is part of a healthy diet, but you need to know the difference between \"bad\" fats and \"good\" fats. \"Bad\" fats, such as saturated and trans fats, are found in foods such as butter; coconut and palm oil; saturated or partially hydrogenated vegetable fats such as shortening and margarine; animal fats in meats; and fats in whole milk dairy products. Limit the amount of saturated fat in your diet, and avoid trans fat completely. Unsaturated fat is the \"good\" fat. Most fats in fish, vegetables, grains and tree nuts are unsaturated. Try to eat unsaturated fat in place of saturated fat. For example, you can use olive oil or canola oil in cooking instead of " "butter.     Use healthier cooking methods. Baking, broiling and roasting are the healthiest ways to prepare meat, poultry and other foods. Trim any outside fat or skin before cooking. Lean cuts can be pan-broiled or stir-fried. Use either a nonstick pan or nonstick cooking spray instead of adding fats such as butter or margarine. When eating out, ask how food is prepared. You can request that your food be baked, broiled or roasted, rather than fried.   Look for other sources of protein. Fish, dry beans, tree nuts, peas and lentils offer protein, nutrients and fiber without the cholesterol and saturated fats that meats have. Consider eating one \"meatless\" meal each week. Try substituting beans for meat in a favorite recipe, such as lasagna or chili. Snack on a handful of almonds or pecans. Soy is also an excellent source of protein. Good examples of soy include soy milk, edamame (green soy beans), tofu and soy protein shakes.     Get more fiber in your diet. Add good sources of fiber to your meals. Examples include fruits, vegetables, whole grains (such as oat bran, whole and rolled oats and barley), legumes (such as beans and peas) and nuts and seeds (such as ground flax seed). In addition to fiber, whole grains supply B-vitamins and important nutrients not found in foods made with white flour.     Eat more fish. Fish are an excellent source of omega-3 fatty acids. Wild-caught oily fish, such as salmon, tuna, mackerel and sardines, are the best sources of omega-3s, but all fish contain some amount of this beneficial fatty acid. Aim for 2 6-oz servings each week.  Enclosed is a copy of the results.      Thank you for choosing [facility]. We appreciate the opportunity to serve you and look forward to supporting your healthcare needs in the future.    If you have any questions or concerns, please call me or my nurse at [phone].        Sincerely,      Kasandra Strauss MD/Marilynn Palomo Team " Coordinator            Results for orders placed or performed in visit on 06/01/18   Hepatitis C Screen Reflex to HCV RNA Quant and Genotype   Result Value Ref Range    Hepatitis C Antibody Nonreactive NR^Nonreactive   Lipid panel reflex to direct LDL Fasting   Result Value Ref Range    Cholesterol 209 (H) <200 mg/dL    Triglycerides 125 <150 mg/dL    HDL Cholesterol 43 (L) >49 mg/dL    LDL Cholesterol Calculated 141 (H) <100 mg/dL    Non HDL Cholesterol 166 (H) <130 mg/dL   Hemoglobin A1c   Result Value Ref Range    Hemoglobin A1C 5.2 0 - 5.6 %

## 2018-06-01 NOTE — PROGRESS NOTES
SUBJECTIVE:   Amelia Ozuna is a 67 year old female who presents for Preventive Visit.  Are you in the first 12 months of your Medicare coverage?  No    Physical   Annual:     Getting at least 3 servings of Calcium per day::  Yes    Bi-annual eye exam::  Yes    Dental care twice a year::  Yes    Sleep apnea or symptoms of sleep apnea::  None    Diet::  Regular (no restrictions)    Frequency of exercise::  2-3 days/week    Duration of exercise::  15-30 minutes    Taking medications regularly::  Yes    Medication side effects::  Not applicable    Additional concerns today::  No    Ability to successfully perform activities of daily living: no assistance needed  Home Safety:  No safety concerns identified  Hearing Impairment: need to ask people to speak up or repeat themselves    Fall risk:  Fallen 2 or more times in the past year?: No  Any fall with injury in the past year?: No    COGNITIVE SCREEN  1) Repeat 3 items (Banana, Sunrise, Chair)    2) Clock draw: NORMAL  3) 3 item recall: Recalls 3 objects  Results: 3 items recalled: COGNITIVE IMPAIRMENT LESS LIKELY    Mini-CogTM Copyright ROMARIO Be. Licensed by the author for use in [facility]; reprinted with permission (aleida@[facility]). All rights reserved.        Reviewed and updated as needed this visit by clinical staff  Tobacco  Allergies  Meds  Med Hx  Surg Hx  Fam Hx  Soc Hx        Reviewed and updated as needed this visit by Provider        Social History   Substance Use Topics     Smoking status: Never Smoker     Smokeless tobacco: Never Used     Alcohol use No       Alcohol Use 6/1/2018   If you drink alcohol do you typically have greater than 3 drinks per day OR greater than 7 drinks per week? No       Spots on face possible derm referral     Today's PHQ-2 Score:   PHQ-2 ( 1999 Pfizer) 6/1/2018   Q1: Little interest or pleasure in doing things 0   Q2: Feeling down, depressed or hopeless 0   PHQ-2 Score 0   Q1: Little interest or pleasure in  doing things Not at all   Q2: Feeling down, depressed or hopeless Not at all   PHQ-2 Score 0       Do you feel safe in your environment - Yes    Do you have a Health Care Directive?: No: Advance care planning reviewed with patient; information given to patient to review.    Current providers sharing in care for this patient include:   Patient Care Team:  Randall Neil as PCP - General    The following health maintenance items are reviewed in Epic and correct as of today:  Health Maintenance   Topic Date Due     HEPATITIS C SCREENING  11/01/1967     DEXA SCAN SCREENING (SYSTEM ASSIGNED)  11/01/2014     ADVANCE DIRECTIVE PLANNING Q5 YRS  11/29/2016     COLONOSCOPY Q10 YR  09/10/2017     MAMMO Q1 YR  12/01/2017     FALL RISK ASSESSMENT  12/05/2017     INFLUENZA VACCINE (Season Ended) 09/01/2018     TETANUS Q10 YR  11/29/2021     LIPID SCREEN Q5 YR FEMALE (SYSTEM ASSIGNED)  12/05/2021     PNEUMOCOCCAL  Completed     Patient Active Problem List   Diagnosis     Hypercholesterolemia     HYPERLIPIDEMIA LDL GOAL <130     Advanced directives, counseling/discussion     BPPV (benign paroxysmal positional vertigo)     Seborrheic keratoses     Past Surgical History:   Procedure Laterality Date     C LIGATE FALLOPIAN TUBE,POSTPARTUM  1981    Tubal Ligation     C NONSPECIFIC PROCEDURE      Tonsillectomy     C NONSPECIFIC PROCEDURE      Oophorectomy benign serous cystadenoma (L)       Social History   Substance Use Topics     Smoking status: Never Smoker     Smokeless tobacco: Never Used     Alcohol use No     Family History   Problem Relation Age of Onset     Alcohol/Drug Father      Alzheimer Disease Mother 92     Neurologic Disorder Sister      3-one sister with hydrocephalous     Family History Negative Brother      3     Breast Cancer No family hx of      Cancer - colorectal No family hx of              Review of Systems  Constitutional, HEENT, cardiovascular, pulmonary, GI, , musculoskeletal, neuro, skin,  "endocrine and psych systems are negative, except as otherwise noted.    OBJECTIVE:   /80 (BP Location: Right arm, Patient Position: Chair, Cuff Size: Adult Regular)  Pulse 75  Temp 99  F (37.2  C) (Oral)  Resp 16  Ht 5' 4\" (1.626 m)  Wt 152 lb 11.2 oz (69.3 kg)  SpO2 97%  Breastfeeding? No  BMI 26.21 kg/m2 Estimated body mass index is 26.21 kg/(m^2) as calculated from the following:    Height as of this encounter: 5' 4\" (1.626 m).    Weight as of this encounter: 152 lb 11.2 oz (69.3 kg).  Physical Exam  GENERAL APPEARANCE: healthy, alert and no distress  EYES: Eyes grossly normal to inspection, PERRL and conjunctivae and sclerae normal  HENT: ear canals and TM's normal, nose and mouth without ulcers or lesions, oropharynx clear and oral mucous membranes moist  NECK: no adenopathy, no asymmetry, masses, or scars and thyroid normal to palpation  RESP: lungs clear to auscultation - no rales, rhonchi or wheezes  BREAST: normal without masses, tenderness or nipple discharge and no palpable axillary masses or adenopathy  CV: regular rate and rhythm, normal S1 S2, no S3 or S4, no murmur, click or rub, no peripheral edema and peripheral pulses strong  ABDOMEN: soft, nontender, no hepatosplenomegaly, no masses and bowel sounds normal  MS: no musculoskeletal defects are noted and gait is age appropriate without ataxia  SKIN: no suspicious lesions or rashes  NEURO: Normal strength and tone, sensory exam grossly normal, mentation intact and speech normal  PSYCH: mentation appears normal and affect normal/bright    ASSESSMENT / PLAN:     1. Routine general medical examination at a health care facility  - Hemoglobin A1c  - DERMATOLOGY REFERRAL    2. Screen for colon cancer  - GASTROENTEROLOGY ADULT REF PROCEDURE ONLY    3. Asymptomatic postmenopausal status  - DEXA HIP/PELVIS/SPINE - Future; Future    4. Visit for screening mammogram  - MA SCREENING DIGITAL BILAT - Future  (s+30); Future    5. Need for hepatitis C " "screening test  - Hepatitis C Screen Reflex to HCV RNA Quant and Genotype    6. Hypercholesterolemia - has been excessively high in the past, will recheck today  - Lipid panel reflex to direct LDL Fasting    7. Seborrheic keratoses - discussed diagnosis, suggested dermatology consultation due to several moles on her body and a long hx of sun exposure living in [place]      End of Life Planning:  Patient currently has an advanced directive: No.  I have verified the patient's ablity to prepare an advanced directive/make health care decisions.  Literature was provided to assist patient in preparing an advanced directive.    COUNSELING:  Reviewed preventive health counseling, as reflected in patient instructions    Estimated body mass index is 26.21 kg/(m^2) as calculated from the following:    Height as of this encounter: 5' 4\" (1.626 m).    Weight as of this encounter: 152 lb 11.2 oz (69.3 kg).     reports that she has never smoked. She has never used smokeless tobacco.      Appropriate preventive services were discussed with this patient, including applicable screening as appropriate for cardiovascular disease, diabetes, osteopenia/osteoporosis, and glaucoma.  As appropriate for age/gender, discussed screening for colorectal cancer, prostate cancer, breast cancer, and cervical cancer. Checklist reviewing preventive services available has been given to the patient.    Reviewed patients plan of care and provided an AVS. The Basic Care Plan (routine screening as documented in Health Maintenance) for Amelia meets the Care Plan requirement. This Care Plan has been established and reviewed with the Patient.    Kasandra Strauss MD  [facility]    Answers for HPI/ROS submitted by the patient on 6/1/2018   PHQ-2 Score: 0    "

## 2018-06-04 ENCOUNTER — TELEPHONE (OUTPATIENT)
Dept: BONE DENSITY | Facility: CLINIC | Age: 68
End: 2018-06-04

## 2018-06-04 ENCOUNTER — TELEPHONE (OUTPATIENT)
Dept: FAMILY MEDICINE | Facility: CLINIC | Age: 68
End: 2018-06-04

## 2018-06-04 LAB — HCV AB SERPL QL IA: NONREACTIVE

## 2018-06-04 NOTE — TELEPHONE ENCOUNTER
[facility] calling to inform patient not due for a colonoscopy till 2027.     Are you able to document this?        Marilynn Palomo

## 2018-06-04 NOTE — TELEPHONE ENCOUNTER
Charanjit Strauss can you update this?  Unsure of how to do this in Epic  Moon Castellanos RN, BSN

## 2018-06-05 NOTE — TELEPHONE ENCOUNTER
Called [facility] and they did confirm patient had a colonoscopy in 2017. They are sending the report over to [phone] attention. Will postpone encounter for 2 days to make sure we get the report.  Anne Calderon

## 2018-06-05 NOTE — TELEPHONE ENCOUNTER
Need her colonoscopy report in order to pass metrics. Please call to get this before I update her HM. STANLEY

## 2018-06-05 NOTE — TELEPHONE ENCOUNTER
Received the report from [facility] and placed the report at Dr. Strauss's desk to review. Patient did have a colonoscopy done on 09/11/2017 and doesn't need another one for 10 years.    Anne Calderon

## 2018-06-13 DIAGNOSIS — F33.0 MILD EPISODE OF RECURRENT MAJOR DEPRESSIVE DISORDER (H): Primary | ICD-10-CM

## 2018-06-13 NOTE — TELEPHONE ENCOUNTER
"Last Written Prescription Date:  05/23/17  Last Fill Quantity: 30,  # refills: 5   Last office visit: 6/1/2018 with prescribing provider:  Dr Strauss, 05/23/17 with Calvin Fitzpatrick   Future Office Visit:      Requested Prescriptions   Pending Prescriptions Disp Refills     buPROPion (WELLBUTRIN XL) 150 MG 24 hr tablet [Pharmacy Med Name: BUPROPION XL 150MG TABLETS (24 H)] 30 tablet 0     Sig: TAKE 1 TABLET BY MOUTH EVERY MORNING    SSRIs Protocol Failed    6/13/2018 11:16 AM       Failed - Recent (12 mo) or future (30 days) visit within the authorizing provider's specialty    Patient had office visit in the last 12 months or has a visit in the next 30 days with authorizing provider or within the authorizing provider's specialty.  See \"Patient Info\" tab in inbasket, or \"Choose Columns\" in Meds & Orders section of the refill encounter.           Passed - Medication is Bupropion    If the medication is Bupropion (Wellbutrin), and the patient is taking for smoking cessation; OK to refill.         Passed - Patient is age 18 or older       Passed - No active pregnancy on record       Passed - No positive pregnancy test in last 12 months      .    "

## 2018-06-15 NOTE — TELEPHONE ENCOUNTER
Called cell number which is for daughter but CTC on file is not filled out correctly so not valid.      LMTRC on home number    Moon Castellanos RN, BSN

## 2018-06-18 NOTE — TELEPHONE ENCOUNTER
"Pt has been off the Wellbutrin for 5-6 months \"but I feeling sad and want to go back\"     She has sister DX with cancer and mother with Alzheimer's.  \"Alot of stress\"     PHQ updated = 10     Would like to go back on medication     Please advise     Annalisa Vaca RN    "

## 2018-06-19 RX ORDER — BUPROPION HYDROCHLORIDE 150 MG/1
TABLET ORAL
Qty: 30 TABLET | Refills: 5 | Status: SHIPPED | OUTPATIENT
Start: 2018-06-19 | End: 2019-04-08

## 2018-06-19 ASSESSMENT — PATIENT HEALTH QUESTIONNAIRE - PHQ9: SUM OF ALL RESPONSES TO PHQ QUESTIONS 1-9: 10

## 2018-06-25 ENCOUNTER — TELEPHONE (OUTPATIENT)
Dept: BONE DENSITY | Facility: CLINIC | Age: 68
End: 2018-06-25

## 2018-08-27 ENCOUNTER — TELEPHONE (OUTPATIENT)
Dept: FAMILY MEDICINE | Facility: CLINIC | Age: 68
End: 2018-08-27

## 2018-08-27 DIAGNOSIS — H92.01 OTALGIA, RIGHT: Primary | ICD-10-CM

## 2018-08-27 NOTE — TELEPHONE ENCOUNTER
Patient calling in stating she is still have ear pain, she is requesting a referral for the ear pain since Dr. Strauss can't find anything wrong. Please call patient at [phone].    Anne Calderon

## 2018-08-29 NOTE — TELEPHONE ENCOUNTER
Left message for patient to return our call.  See message below.  Moon Castellanos RN    Your provider has referred you to: [facility]: [facility] [place] [facility] [place] [phone]   [email or web address]

## 2018-08-30 ENCOUNTER — TRANSFERRED RECORDS (OUTPATIENT)
Dept: HEALTH INFORMATION MANAGEMENT | Facility: CLINIC | Age: 68
End: 2018-08-30

## 2018-09-24 ENCOUNTER — HOSPITAL ENCOUNTER (OUTPATIENT)
Dept: MAMMOGRAPHY | Facility: CLINIC | Age: 68
Discharge: HOME | End: 2018-09-24
Attending: FAMILY MEDICINE | Admitting: FAMILY MEDICINE
Payer: COMMERCIAL

## 2018-09-24 ENCOUNTER — RADIANT APPOINTMENT (OUTPATIENT)
Dept: BONE DENSITY | Facility: CLINIC | Age: 68
End: 2018-09-24
Attending: FAMILY MEDICINE
Payer: COMMERCIAL

## 2018-09-24 DIAGNOSIS — Z78.0 ASYMPTOMATIC POSTMENOPAUSAL STATUS: ICD-10-CM

## 2018-09-24 DIAGNOSIS — Z12.31 VISIT FOR SCREENING MAMMOGRAM: ICD-10-CM

## 2018-09-24 PROCEDURE — 77067 SCR MAMMO BI INCL CAD: CPT

## 2018-09-24 PROCEDURE — 77080 DXA BONE DENSITY AXIAL: CPT | Performed by: INTERNAL MEDICINE

## 2019-01-03 ENCOUNTER — OFFICE VISIT (OUTPATIENT)
Dept: FAMILY MEDICINE | Facility: CLINIC | Age: 69
End: 2019-01-03
Payer: COMMERCIAL

## 2019-01-03 VITALS
SYSTOLIC BLOOD PRESSURE: 120 MMHG | RESPIRATION RATE: 16 BRPM | WEIGHT: 139.6 LBS | TEMPERATURE: 98.4 F | OXYGEN SATURATION: 96 % | DIASTOLIC BLOOD PRESSURE: 80 MMHG | HEART RATE: 73 BPM | HEIGHT: 64 IN | BODY MASS INDEX: 23.83 KG/M2

## 2019-01-03 DIAGNOSIS — E78.00 HYPERCHOLESTEROLEMIA: ICD-10-CM

## 2019-01-03 DIAGNOSIS — R30.0 DYSURIA: Primary | ICD-10-CM

## 2019-01-03 LAB
ALBUMIN UR-MCNC: NEGATIVE MG/DL
APPEARANCE UR: CLEAR
BILIRUB UR QL STRIP: NEGATIVE
COLOR UR AUTO: YELLOW
GLUCOSE UR STRIP-MCNC: NEGATIVE MG/DL
HGB UR QL STRIP: ABNORMAL
KETONES UR STRIP-MCNC: NEGATIVE MG/DL
LEUKOCYTE ESTERASE UR QL STRIP: NEGATIVE
NITRATE UR QL: NEGATIVE
NON-SQ EPI CELLS #/AREA URNS LPF: NORMAL /LPF
PH UR STRIP: 6 PH (ref 5–7)
RBC #/AREA URNS AUTO: NORMAL /HPF
SOURCE: ABNORMAL
SP GR UR STRIP: 1.01 (ref 1–1.03)
UROBILINOGEN UR STRIP-ACNC: 0.2 EU/DL (ref 0.2–1)
WBC #/AREA URNS AUTO: NORMAL /HPF

## 2019-01-03 PROCEDURE — 99213 OFFICE O/P EST LOW 20 MIN: CPT | Performed by: FAMILY MEDICINE

## 2019-01-03 PROCEDURE — 81001 URINALYSIS AUTO W/SCOPE: CPT | Performed by: FAMILY MEDICINE

## 2019-01-03 ASSESSMENT — MIFFLIN-ST. JEOR: SCORE: 1143.22

## 2019-01-03 NOTE — PROGRESS NOTES
SUBJECTIVE:   Amelia Ozuna is a 68 year old female presenting with a chief complaint of   Chief Complaint   Patient presents with     UTI       She is an established patient of [facility].    UTI    HPI: The patient is a 68-year-old, postmenopausal female, with history of intermittent dysuria (at the end of urination), noted approximately once a week the past 2-3 months.  Patient last had symptoms last evening, but no current dysuria reported.  No fever, chills, nausea, vomiting, urinary frequency, hematuria, lower abdominal pain, back pain, UTI/STD risk, vaginal discharge, vaginal bleeding, or vaginal odor.  Patient would like to rule out a UTI, given the persistence of her symptoms.    Patient incidentally would like to discuss her cholesterol results from 6/1/2018, as her primary MD suggested that she should start a cholesterol medication.  Patient denies being on cholesterol medication in the past.  Cholesterol results from 6/1/18 were reviewed with patient today (Cholesterol 209, HDL 43), with calculated CAD risk 8.13%.  Patient is a non-smoker.  No history of hypertension, diabetes, or CAD.    Review of Systems   GERD versus tightness involving the epigastric region every day, intermittently treated with Prilosec.  EGD was within normal limits ~2 years ago.  No chest pain, shortness of breath, or exertional symptoms.    Patient Active Problem List   Diagnosis     Hypercholesterolemia     HYPERLIPIDEMIA LDL GOAL <130     Advanced directives, counseling/discussion     BPPV (benign paroxysmal positional vertigo)     Seborrheic keratoses     Past Medical History:   Diagnosis Date     Leiomyoma of uterus, unspecified 10/2000     Family History   Problem Relation Age of Onset     Alcohol/Drug Father      Alzheimer Disease Mother 92     Neurologic Disorder Sister         3-one sister with hydrocephalous     Family History Negative Brother         3     Breast Cancer No family hx of      Cancer - colorectal No  "family hx of      Current Outpatient Medications   Medication Sig Dispense Refill     buPROPion (WELLBUTRIN XL) 150 MG 24 hr tablet TAKE 1 TABLET BY MOUTH EVERY MORNING 30 tablet 5     SESAME OIL Once in a day       Social History     Tobacco Use     Smoking status: Never Smoker     Smokeless tobacco: Never Used   Substance Use Topics     Alcohol use: No       OBJECTIVE  /80 (BP Location: Right arm, Patient Position: Chair, Cuff Size: Adult Regular)   Pulse 73   Temp 98.4  F (36.9  C) (Oral)   Resp 16   Ht 1.626 m (5' 4\")   Wt 63.3 kg (139 lb 9.6 oz)   SpO2 96%   Breastfeeding? No   BMI 23.96 kg/m      Physical Exam    GENERAL APPEARANCE:  Awake, alert, and in no acute distress.  PSYCHIATRIC:  Pleasant affect.  HEENT:  Sclera anicteric.  No conjunctivitis.  Mucous membranes moist.  Neck:  Spontaneous full range of motion.  HEART:  Regular rate and rhythm.  No murmurs, rubs, or gallops.  LUNGS:  No respiratory distress.  No wheezes, rales, or rhonchi   ABDOMEN:   Soft and not distended, without organomegaly.  No tenderness to palpation.  BACK: No CVA tenderness.  :  Deferred.  SKIN:  No rash.    Labs:  Results for orders placed or performed in visit on 01/03/19 (from the past 24 hour(s))   *UA reflex to Microscopic and Culture ([place] and [facility] (except [place] and [place])   Result Value Ref Range    Color Urine Yellow     Appearance Urine Clear     Glucose Urine Negative NEG^Negative mg/dL    Bilirubin Urine Negative NEG^Negative    Ketones Urine Negative NEG^Negative mg/dL    Specific Gravity Urine 1.010 1.003 - 1.035    Blood Urine Trace (A) NEG^Negative    pH Urine 6.0 5.0 - 7.0 pH    Protein Albumin Urine Negative NEG^Negative mg/dL    Urobilinogen Urine 0.2 0.2 - 1.0 EU/dL    Nitrite Urine Negative NEG^Negative    Leukocyte Esterase Urine Negative NEG^Negative    Source Midstream Urine    Urine Microscopic   Result Value Ref Range    WBC Urine 0 - 5 OTO5^0 - 5 /HPF    RBC Urine O - " 2 OTO2^O - 2 /HPF    Squamous Epithelial /LPF Urine Few FEW^Few /LPF     ASSESSMENT:      ICD-10-CM    1. Dysuria, intermittent times 2-3 months.   Urinalysis is benign today. R30.0 *UA reflex to Microscopic and Culture ([place] and [facility] (except [place] and [place])     Urine Microscopic   2. Hypercholesterolemia. E78.00       PLAN:      Patient declines further testing or Urine Culture.    Increase fluid intake, as discussed.    Follow-up if worsening symptoms or further concerns, as discussed.      Patient declines statin treatment, risk/benefits discussed at time of exam today.    Contact primary care if interested in starting statin treatment, as discussed.    Discussed risks and benefits of treatment strategies, as noted in the Assessment and Plan sections.    The patient was discharged ambulatory and in stable condition post discussion of follow up.     Patrica Mcintosh MD  [facility]

## 2019-03-10 DIAGNOSIS — F33.0 MILD EPISODE OF RECURRENT MAJOR DEPRESSIVE DISORDER (H): ICD-10-CM

## 2019-03-10 NOTE — TELEPHONE ENCOUNTER
"Requested Prescriptions   Pending Prescriptions Disp Refills     buPROPion (WELLBUTRIN XL) 150 MG 24 hr tablet  Last Written Prescription Date:  06/19/2018  Last Fill Quantity: 30,  # refills: 5   Last office visit: 1/3/2019 with prescribing provider:  01/03/2019   Future Office Visit:     30 tablet 5     Sig: Take 1 tablet (150 mg) by mouth every morning    SSRIs Protocol Failed - 3/10/2019  1:31 PM  [facility] Follow-up to PHQ 11/9/2015 6/18/2018   PHQ-9 9. Suicide Ideation past 2 weeks Not at all Not at all            Failed - PHQ-9 score less than 5 in past 6 months    Please review last PHQ-9 score.          Passed - Medication is Bupropion    If the medication is Bupropion (Wellbutrin), and the patient is taking for smoking cessation; OK to refill.         Passed - Medication is active on med list       Passed - Patient is age 18 or older       Passed - No active pregnancy on record       Passed - No positive pregnancy test in last 12 months       Passed - Recent (6 mo) or future (30 days) visit within the authorizing provider's specialty    Patient had office visit in the last 6 months or has a visit in the next 30 days with authorizing provider or within the authorizing provider's specialty.  See \"Patient Info\" tab in inbasket, or \"Choose Columns\" in Meds & Orders section of the refill encounter.              "

## 2019-03-11 ENCOUNTER — TELEPHONE (OUTPATIENT)
Dept: FAMILY MEDICINE | Facility: CLINIC | Age: 69
End: 2019-03-11

## 2019-03-11 RX ORDER — BUPROPION HYDROCHLORIDE 150 MG/1
150 TABLET ORAL EVERY MORNING
Qty: 30 TABLET | Refills: 5 | OUTPATIENT
Start: 2019-03-11

## 2019-03-11 NOTE — TELEPHONE ENCOUNTER
LMTRC  Needs PHQ updated  Is pt still taking (basedon date she would have run out in December 2018)    PHQ-9 SCORE 7/15/2014 11/9/2015 6/18/2018   PHQ-9 Total Score 3 - -   PHQ-9 Total Score - 0 10

## 2019-03-11 NOTE — TELEPHONE ENCOUNTER
03/11/19  Pt daughter called stated she received a call Re : her mothers medication,  [place] to reorder her medication buPROPion some time in May 2019.

## 2019-05-08 ENCOUNTER — OFFICE VISIT (OUTPATIENT)
Dept: DERMATOLOGY | Facility: CLINIC | Age: 69
End: 2019-05-08
Payer: COMMERCIAL

## 2019-05-08 VITALS — SYSTOLIC BLOOD PRESSURE: 119 MMHG | OXYGEN SATURATION: 96 % | DIASTOLIC BLOOD PRESSURE: 74 MMHG | HEART RATE: 71 BPM

## 2019-05-08 DIAGNOSIS — L57.8 SOLAR ELASTOSIS: ICD-10-CM

## 2019-05-08 DIAGNOSIS — L81.4 LENTIGINES: Primary | ICD-10-CM

## 2019-05-08 DIAGNOSIS — L57.0 ACTINIC KERATOSIS: ICD-10-CM

## 2019-05-08 DIAGNOSIS — L82.0 INFLAMED SEBORRHEIC KERATOSIS: ICD-10-CM

## 2019-05-08 PROCEDURE — 17003 DESTRUCT PREMALG LES 2-14: CPT | Mod: 59 | Performed by: PHYSICIAN ASSISTANT

## 2019-05-08 PROCEDURE — 99203 OFFICE O/P NEW LOW 30 MIN: CPT | Mod: 25 | Performed by: PHYSICIAN ASSISTANT

## 2019-05-08 PROCEDURE — 17000 DESTRUCT PREMALG LESION: CPT | Mod: 59 | Performed by: PHYSICIAN ASSISTANT

## 2019-05-08 PROCEDURE — 17110 DESTRUCTION B9 LES UP TO 14: CPT | Performed by: PHYSICIAN ASSISTANT

## 2019-05-08 NOTE — PROGRESS NOTES
HPI:  Amelia Ozuna is a 68 year old female patient here today for irritated spots on right temple and cheek .  Patient states this has been present for a while.  Patient reports the following symptoms: itchy .  Patient reports the following previous treatments: none.  Patient reports the following modifying factors: none.  Associated symptoms: none. Also has some pink scaly spots on face for a while.  Patient has no other skin complaints today.  Remainder of the HPI, Meds, PMH, Allergies, FH, and SH was reviewed in chart.    Pertinent Hx:   No personal or family history of skin cancer    Past Medical History:   Diagnosis Date     Leiomyoma of uterus, unspecified 10/2000       Past Surgical History:   Procedure Laterality Date     C LIGATE FALLOPIAN TUBE,POSTPARTUM  1981    Tubal Ligation     C NONSPECIFIC PROCEDURE      Tonsillectomy     C NONSPECIFIC PROCEDURE      Oophorectomy benign serous cystadenoma (L)        Family History   Problem Relation Age of Onset     Alcohol/Drug Father      Alzheimer Disease Mother 92     Neurologic Disorder Sister         3-one sister with hydrocephalous     Family History Negative Brother         3     Breast Cancer No family hx of      Cancer - colorectal No family hx of        Social History     Socioeconomic History     Marital status:      Spouse name: Not on file     Number of children: Not on file     Years of education: Not on file     Highest education level: Not on file   Occupational History     Not on file   Social Needs     Financial resource strain: Not on file     Food insecurity:     Worry: Not on file     Inability: Not on file     Transportation needs:     Medical: Not on file     Non-medical: Not on file   Tobacco Use     Smoking status: Never Smoker     Smokeless tobacco: Never Used   Substance and Sexual Activity     Alcohol use: No     Drug use: No     Sexual activity: Yes     Partners: Male     Birth control/protection: Surgical     Comment:  menopausal    Lifestyle     Physical activity:     Days per week: Not on file     Minutes per session: Not on file     Stress: Not on file   Relationships     Social connections:     Talks on phone: Not on file     Gets together: Not on file     Attends Amish service: Not on file     Active member of club or organization: Not on file     Attends meetings of clubs or organizations: Not on file     Relationship status: Not on file     Intimate partner violence:     Fear of current or ex partner: Not on file     Emotionally abused: Not on file     Physically abused: Not on file     Forced sexual activity: Not on file   Other Topics Concern     Parent/sibling w/ CABG, MI or angioplasty before 65F 55M? No   Social History Narrative     Not on file       Outpatient Encounter Medications as of 5/8/2019   Medication Sig Dispense Refill     fluocin-hydroquinone-tretinoin (TRI-MARJ) 0.01-4-0.05 % CREA Apply to affected area on hands 1-2x a day for 3 months 30 g 1     SESAME OIL Once in a day       buPROPion (WELLBUTRIN XL) 150 MG 24 hr tablet Take 1 tablet (150 mg) by mouth every morning (Patient not taking: Reported on 5/8/2019) 30 tablet 0     No facility-administered encounter medications on file as of 5/8/2019.        Review Of Systems:  Skin: As above  Eyes: negative  Ears/Nose/Throat: negative  Respiratory: No shortness of breath, dyspnea on exertion, cough, or hemoptysis  Cardiovascular: negative  Gastrointestinal: negative  Genitourinary: negative  Musculoskeletal: negative  Neurologic: negative  Psychiatric: negative  Hematologic/Lymphatic/Immunologic: negative  Endocrine: negative      Objective:     /74   Pulse 71   SpO2 96%   Eyes: Conjunctivae/lids: Normal   ENT: Lips:  Normal  MSK: Normal  Cardiovascular: Peripheral edema none  Pulm: Breathing Normal  Neuro/Psych: Orientation: Normal; Mood/Affect: Normal, NAD, WDWN  Pt accompanied by: self  Following areas examined: face, neck, hands  Dan  skin type:ii   Findings:  Ratliff WD smooth macules on hand  Inflamed brown, stuck-on scaly appearing papules on right temple, forehead, and cheek x3  Pink scaly macule on left upper cutaneous lip and left suprabrow x 3  Rhytides, hypo/hyperpigmentation, and atrophy    Assessment and Plan:  1) Actinic keratoses x 3 and solar elastosis    LN2 for 5 seconds x 2. Discussed AE include hypopigmentation (white spot) and recurrence. Follow up in 2-3 months to recheck lesions. There is a 0.025%-20% chance that AKs can develop into a SCC.   Treatment options include LN2 vs PDT vs Efudex. Pt elected LN2     Wear a sunscreen with at least SPF 30 on your face, ears, neck and V of the chest daily. Wear sunscreen on other areas of the body if those areas are exposed to the sun throughout the day. Sunscreens can contain physical and/or chemical blockers. Physical blockers are less likely to clog pores, these include zinc oxide and titanium dioxide. Reapply every two hour and after swimming. Sunscreen examples include Neutrogena, CeraVe, Blue Lizard, Elta MD and many others.    2) ISK x 3  Benign etiology and course of lesion.  LN2: Treated with LN2 for 5s for 1-2 cycles. Warned risks of blistering, pain, pigment change, scarring, and incomplete resolution.  Advised patient to return if lesions do not completely resolve within 2-3 months.  Wound care sheet given.    3) lentigines  Treatment of these lesions would be purely cosmetic and not medically neccessary.  Lesion may recur and/or may not completely resolve. May need additional treatment.  Different removal options including cryotherapy, and /or laser.    Tri-yoselin: Apply to affected area on hands daily for 3 months        Follow up in 2-3 months to recheck aks

## 2019-05-08 NOTE — PATIENT INSTRUCTIONS
Tri-yoselin: Apply to affected area on hands daily for 3 months      WOUND CARE INSTRUCTIONS  FOR CRYOSURGERY        This area treated with liquid nitrogen will form a blister. You do not need to bandage the area until after the blister forms and breaks (which may be a few days).  When the blister breaks, begin daily dressing changes as follows:    1) Clean and dry the area with tap water using clean Q-tip or sterile gauze pad.    2) Apply Polysporin ointment or Bacitracin ointment over entire wound.  Do NOT use Neosporin ointment.    3) Cover the wound with a band-aid or sterile non-stick gauze pad and micropore paper tape.      REPEAT THESE INSTRUCTIONS AT LEAST ONCE A DAY UNTIL THE WOUND HAS COMPLETELY HEALED.        It is an old wives tale that a wound heals better when it is exposed to air and allowed to dry out. The wound will heal faster with a better cosmetic result if it is kept moist with ointment and covered with a bandage.  Do not let the wound dry out.      Supplies Needed:     *Cotton tipped applicators (Q-tips)   *Polysporin ointment or Bacitracin ointment (NOT NEOSPORIN)   *Band-aids, or non stick gauze pads and micropore paper tape    PATIENT INFORMATION    During the healing process you will notice a number of changes. All wounds develop a small halo of redness surrounding the wound.  This means healing is occurring. Severe itching with extensive redness usually indicates sensitivity to the ointment or bandage tape used to dress the wound.  You should call our office if this develops.      Swelling and/or discoloration around your surgical site is common, particularly when performed around the eye.    All wounds normally drain.  The larger the wound the more drainage there will be.  After 7-10 days, you will notice the wound beginning to shrink and new skin will begin to grow.  The wound is healed when you can see skin has formed over the entire area.  A healed wound has a healthy, shiny look to the  surface and is red to dark pink in color to normalize.  Wounds may take approximately 4-6 weeks to heal.  Larger wounds may take 6-8 weeks.  After the wound is healed you may discontinue dressing changes.    You may experience a sensation of tightness as your wound heals. This is normal and will gradually subside.    Your healed wound may be sensitive to temperature changes. This sensitivity improves with time, but if you re having a lot of discomfort, try to avoid temperature extremes.    Patients frequently experience itching after their wound appears to have healed because of the continue healing under the skin.  Plain Vaseline will help relieve the itching.               Wear a sunscreen with at least SPF 30 on your face, ears, neck and V of the chest daily. Wear sunscreen on other areas of the body if those areas are exposed to the sun throughout the day. Sunscreens can contain physical and/or chemical blockers. Physical blockers are less likely to clog pores, these include zinc oxide and titanium dioxide. Reapply every two hour and after swimming. Sunscreen examples include Neutrogena, CeraVe, Ezekiel Lizard, Elta MD and many others.    UV radiation  UVA radiation remains constant throughout the day and throughout the year. It is a longer wavelength than UVB and therefore penetrates deeper into the skin leading to immediate and delayed tanning, photoaging, and skin cancer. 70-80% of UVA and UVB radiation occurs between the hours of 10am-2pm.  UVB radiation  UVB radiation causes the most harmful effects and is more significant during the summer months. However, snow and ice can reflect UVB radiation leading to skin damage during the winter months as well. UVB radiation is responsible for tanning, burning, inflammation, delayed erythema (pinkness), pigmentation (brown spots), and skin cancer.

## 2019-05-08 NOTE — LETTER
5/8/2019         RE: Amelia Ozuna  [address] [address] [facility] [address]        Dear Colleague,    Thank you for referring your patient, Amelia Ozuna, to the [facility]. Please see a copy of my visit note below.    HPI:  Amelia Ozuna is a 68 year old female patient here today for irritated spots on right temple and cheek .  Patient states this has been present for a while.  Patient reports the following symptoms: itchy .  Patient reports the following previous treatments: none.  Patient reports the following modifying factors: none.  Associated symptoms: none. Also has some pink scaly spots on face for a while.  Patient has no other skin complaints today.  Remainder of the HPI, Meds, PMH, Allergies, FH, and SH was reviewed in chart.    Pertinent Hx:   No personal or family history of skin cancer    Past Medical History:   Diagnosis Date     Leiomyoma of uterus, unspecified 10/2000       Past Surgical History:   Procedure Laterality Date     C LIGATE FALLOPIAN TUBE,POSTPARTUM  1981    Tubal Ligation     C NONSPECIFIC PROCEDURE      Tonsillectomy     C NONSPECIFIC PROCEDURE      Oophorectomy benign serous cystadenoma (L)        Family History   Problem Relation Age of Onset     Alcohol/Drug Father      Alzheimer Disease Mother 92     Neurologic Disorder Sister         3-one sister with hydrocephalous     Family History Negative Brother         3     Breast Cancer No family hx of      Cancer - colorectal No family hx of        Social History     Socioeconomic History     Marital status:      Spouse name: Not on file     Number of children: Not on file     Years of education: Not on file     Highest education level: Not on file   Occupational History     Not on file   Social Needs     Financial resource strain: Not on file     Food insecurity:     Worry: Not on file     Inability: Not on file     Transportation needs:     Medical: Not on file     Non-medical: Not on  file   Tobacco Use     Smoking status: Never Smoker     Smokeless tobacco: Never Used   Substance and Sexual Activity     Alcohol use: No     Drug use: No     Sexual activity: Yes     Partners: Male     Birth control/protection: Surgical     Comment: menopausal    Lifestyle     Physical activity:     Days per week: Not on file     Minutes per session: Not on file     Stress: Not on file   Relationships     Social connections:     Talks on phone: Not on file     Gets together: Not on file     Attends Zoroastrianism service: Not on file     Active member of club or organization: Not on file     Attends meetings of clubs or organizations: Not on file     Relationship status: Not on file     Intimate partner violence:     Fear of current or ex partner: Not on file     Emotionally abused: Not on file     Physically abused: Not on file     Forced sexual activity: Not on file   Other Topics Concern     Parent/sibling w/ CABG, MI or angioplasty before 65F 55M? No   Social History Narrative     Not on file       Outpatient Encounter Medications as of 5/8/2019   Medication Sig Dispense Refill     fluocin-hydroquinone-tretinoin (TRI-MARJ) 0.01-4-0.05 % CREA Apply to affected area on hands 1-2x a day for 3 months 30 g 1     SESAME OIL Once in a day       buPROPion (WELLBUTRIN XL) 150 MG 24 hr tablet Take 1 tablet (150 mg) by mouth every morning (Patient not taking: Reported on 5/8/2019) 30 tablet 0     No facility-administered encounter medications on file as of 5/8/2019.        Review Of Systems:  Skin: As above  Eyes: negative  Ears/Nose/Throat: negative  Respiratory: No shortness of breath, dyspnea on exertion, cough, or hemoptysis  Cardiovascular: negative  Gastrointestinal: negative  Genitourinary: negative  Musculoskeletal: negative  Neurologic: negative  Psychiatric: negative  Hematologic/Lymphatic/Immunologic: negative  Endocrine: negative      Objective:     /74   Pulse 71   SpO2 96%   Eyes: Conjunctivae/lids:  Normal   ENT: Lips:  Normal  MSK: Normal  Cardiovascular: Peripheral edema none  Pulm: Breathing Normal  Neuro/Psych: Orientation: Normal; Mood/Affect: Normal, NAD, WDWN  Pt accompanied by: self  Following areas examined: face, neck, hands  Dan skin type:ii   Findings:  Ratliff WD smooth macules on hand  Inflamed brown, stuck-on scaly appearing papules on right temple, forehead, and cheek x3  Pink scaly macule on left upper cutaneous lip and left suprabrow x 3  Rhytides, hypo/hyperpigmentation, and atrophy    Assessment and Plan:  1) Actinic keratoses x 3 and solar elastosis    LN2 for 5 seconds x 2. Discussed AE include hypopigmentation (white spot) and recurrence. Follow up in 2-3 months to recheck lesions. There is a 0.025%-20% chance that AKs can develop into a SCC.   Treatment options include LN2 vs PDT vs Efudex. Pt elected LN2     Wear a sunscreen with at least SPF 30 on your face, ears, neck and V of the chest daily. Wear sunscreen on other areas of the body if those areas are exposed to the sun throughout the day. Sunscreens can contain physical and/or chemical blockers. Physical blockers are less likely to clog pores, these include zinc oxide and titanium dioxide. Reapply every two hour and after swimming. Sunscreen examples include Neutrogena, CeraVe, Blue Lizard, Elta MD and many others.    2) ISK x 3  Benign etiology and course of lesion.  LN2: Treated with LN2 for 5s for 1-2 cycles. Warned risks of blistering, pain, pigment change, scarring, and incomplete resolution.  Advised patient to return if lesions do not completely resolve within 2-3 months.  Wound care sheet given.    3) lentigines  Treatment of these lesions would be purely cosmetic and not medically neccessary.  Lesion may recur and/or may not completely resolve. May need additional treatment.  Different removal options including cryotherapy, and /or laser.    Tri-yoselin: Apply to affected area on hands daily for 3 months        Follow up  in 2-3 months to recheck aks      Again, thank you for allowing me to participate in the care of your patient.        Sincerely,        Kasandra Richards PA-C

## 2019-07-30 ENCOUNTER — TELEPHONE (OUTPATIENT)
Dept: FAMILY MEDICINE | Facility: CLINIC | Age: 69
End: 2019-07-30

## 2019-07-30 NOTE — LETTER
[address] [id number] [phone]                                                                                                July 31, 2019    Amelia Ozuna  [address] [facility] [address]        Dear Ms. Ozuna,    At [facility] we care about your health and well-being. A review of your chart has indicated that you are due for a(n) complete physical exam and Depression Screening. Please contact us at [phone] to schedule your appointment.   If you have already had one or all of the above screening tests at another facility, please call us to update your chart.     You may contact the clinic at [phone] if you have any questions or concerns about this request.      Healthy Regards,      Your Care Team

## 2019-07-30 NOTE — TELEPHONE ENCOUNTER
Panel Management Review      Patient has the following on her problem list:     Depression / Dysthymia review    Measure:  Needs PHQ-9 score of 4 or less during index window.  Administer PHQ-9 and if score is 5 or more, send encounter to provider for next steps.    12 month remission PHQ-9 follow up date range: 4/19-8/17      PHQ-9 SCORE 7/15/2014 11/9/2015 6/18/2018   PHQ-9 Total Score 3 - -   PHQ-9 Total Score - 0 10       If PHQ-9 recheck is 5 or more, route to provider for next steps.    Patient is due for:  PHQ9      Composite cancer screening  Chart review shows that this patient is due/due soon for the following None  Summary:    Patient is due/failing the following:   PHQ9 and PHYSICAL    Action needed:   Patient needs office visit for annual wellness exam. and Patient needs to do PHQ9.    Type of outreach:    Routed to care team for outreach    Questions for provider review:    None                                                                                                                                    Jana Feldman,        Chart routed to Care Team .

## 2019-07-31 NOTE — TELEPHONE ENCOUNTER
Panel Management Review      Patient has the following on her problem list:     Depression / Dysthymia review    Measure:  Needs PHQ-9 score of 4 or less during index window.  Administer PHQ-9 and if score is 5 or more, send encounter to provider for next steps.    5 - 7 month window range: Due by 8/17    PHQ-9 SCORE 7/15/2014 11/9/2015 6/18/2018   PHQ-9 Total Score 3 - -   PHQ-9 Total Score - 0 10       If PHQ-9 recheck is 5 or more, route to provider for next steps.    Patient is due for:  PHQ9      Composite cancer screening  Chart review shows that this patient is due/due soon for the following None  Summary:    Patient is due/failing the following:   PHQ9 and PHYSICAL    Action needed:   Patient needs office visit for Wellness Exam. and Patient needs to do PHQ9.    Type of outreach:    Phone, left message for patient to call back.  and Sent letter.    Questions for provider review:    None                                                                                                                                    Patrica Hawkins MA       Chart routed to Care Team .

## 2019-08-14 ENCOUNTER — OFFICE VISIT (OUTPATIENT)
Dept: DERMATOLOGY | Facility: CLINIC | Age: 69
End: 2019-08-14
Payer: COMMERCIAL

## 2019-08-14 VITALS — DIASTOLIC BLOOD PRESSURE: 81 MMHG | HEART RATE: 68 BPM | OXYGEN SATURATION: 97 % | SYSTOLIC BLOOD PRESSURE: 124 MMHG

## 2019-08-14 DIAGNOSIS — D48.5 NEOPLASM OF UNCERTAIN BEHAVIOR OF SKIN: Primary | ICD-10-CM

## 2019-08-14 DIAGNOSIS — L57.8 SOLAR ELASTOSIS: ICD-10-CM

## 2019-08-14 DIAGNOSIS — L57.0 ACTINIC KERATOSES: ICD-10-CM

## 2019-08-14 DIAGNOSIS — L82.0 INFLAMED SEBORRHEIC KERATOSIS: ICD-10-CM

## 2019-08-14 PROCEDURE — 99213 OFFICE O/P EST LOW 20 MIN: CPT | Mod: 25 | Performed by: PHYSICIAN ASSISTANT

## 2019-08-14 PROCEDURE — 88305 TISSUE EXAM BY PATHOLOGIST: CPT | Mod: TC | Performed by: PHYSICIAN ASSISTANT

## 2019-08-14 PROCEDURE — 17110 DESTRUCTION B9 LES UP TO 14: CPT | Performed by: PHYSICIAN ASSISTANT

## 2019-08-14 PROCEDURE — 11103 TANGNTL BX SKIN EA SEP/ADDL: CPT | Performed by: PHYSICIAN ASSISTANT

## 2019-08-14 PROCEDURE — 11102 TANGNTL BX SKIN SINGLE LES: CPT | Mod: 59 | Performed by: PHYSICIAN ASSISTANT

## 2019-08-14 NOTE — LETTER
8/14/2019         RE: Amelia Ozuna  [id number] [address] [place] [id number]        Dear Colleague,    Thank you for referring your patient, Amelia Ozuna, to the [facility]. Please see a copy of my visit note below.    HPI:  Amelia Ozuna is a 68 year old female patient here today for follow up aks on left upper cutaneous lip and left suprabrow x 3. Patient states this has been present for a while.  Patient reports the following symptoms: ln2 with resolution. Has a spot on left arm recently started itching and it bothersome.  Patient reports the following previous treatments: none. Also has a spot on right temple that is very itchy and bothersome. Has had it frozen in the past with no improvement. .  Patient reports the following modifying factors: none.  Associated symptoms: none.  Patient has no other skin complaints today.  Remainder of the HPI, Meds, PMH, Allergies, FH, and SH was reviewed in chart.    Pertinent Hx:   No personal or family history of skin cancer    Past Medical History:   Diagnosis Date     Leiomyoma of uterus, unspecified 10/2000       Past Surgical History:   Procedure Laterality Date     C LIGATE FALLOPIAN TUBE,POSTPARTUM  1981    Tubal Ligation     C NONSPECIFIC PROCEDURE      Tonsillectomy     C NONSPECIFIC PROCEDURE      Oophorectomy benign serous cystadenoma (L)        Family History   Problem Relation Age of Onset     Alcohol/Drug Father      Alzheimer Disease Mother 92     Neurologic Disorder Sister         3-one sister with hydrocephalous     Family History Negative Brother         3     Breast Cancer No family hx of      Cancer - colorectal No family hx of        Social History     Socioeconomic History     Marital status:      Spouse name: Not on file     Number of children: Not on file     Years of education: Not on file     Highest education level: Not on file   Occupational History     Not on file   Social Needs     Financial  resource strain: Not on file     Food insecurity:     Worry: Not on file     Inability: Not on file     Transportation needs:     Medical: Not on file     Non-medical: Not on file   Tobacco Use     Smoking status: Never Smoker     Smokeless tobacco: Never Used   Substance and Sexual Activity     Alcohol use: No     Drug use: No     Sexual activity: Yes     Partners: Male     Birth control/protection: Surgical     Comment: menopausal    Lifestyle     Physical activity:     Days per week: Not on file     Minutes per session: Not on file     Stress: Not on file   Relationships     Social connections:     Talks on phone: Not on file     Gets together: Not on file     Attends [employer] service: Not on file     Active member of club or organization: Not on file     Attends meetings of clubs or organizations: Not on file     Relationship status: Not on file     Intimate partner violence:     Fear of current or ex partner: Not on file     Emotionally abused: Not on file     Physically abused: Not on file     Forced sexual activity: Not on file   Other Topics Concern     Parent/sibling w/ CABG, MI or angioplasty before 65F 55M? No   Social History Narrative     Not on file       Outpatient Encounter Medications as of 8/14/2019   Medication Sig Dispense Refill     SESAME OIL Once in a day       buPROPion (WELLBUTRIN XL) 150 MG 24 hr tablet Take 1 tablet (150 mg) by mouth every morning (Patient not taking: Reported on 5/8/2019) 30 tablet 0     fluocin-hydroquinone-tretinoin (TRI-MARJ) 0.01-4-0.05 % CREA Apply to affected area on hands 1-2x a day for 3 months (Patient not taking: Reported on 8/14/2019) 30 g 1     No facility-administered encounter medications on file as of 8/14/2019.        Review Of Systems:  Skin: As above  Eyes: negative  Ears/Nose/Throat: negative  Respiratory: No shortness of breath, dyspnea on exertion, cough, or hemoptysis  Cardiovascular: negative  Gastrointestinal: negative  Genitourinary:  negative  Musculoskeletal: negative  Neurologic: negative  Psychiatric: negative  Hematologic/Lymphatic/Immunologic: negative  Endocrine: negative      Objective:     There were no vitals taken for this visit.  Eyes: Conjunctivae/lids: Normal   ENT: Lips:  Normal  MSK: Normal  Cardiovascular: Peripheral edema none  Pulm: Breathing Normal  Neuro/Psych: Orientation: Normal; Mood/Affect: Normal, NAD, WDWN  Pt accompanied by: self  Following areas examined: face, right frontal scalp, neck, forearms, left arm, and hands  Dan skin type:ii   Findings:  Inflamed brown, stuck-on scaly appearing papules on right temple 1.0cm  Light and medium brown smooth papule with irregular pigment network centrally. Terminal hairs within 0.5cm  Rhytides, hypo/hyperpigmentation, and atrophy of face  Inflamed brown, stuck-on scaly appearing papules on right cheek x 2  Rhytides, hypo/hyperpigmentation, and atrophy      Assessment and Plan:  1) Neoplasm of uncertain behavior on left upper arm 0.5cm  Rule out atypical nevus  TANGENTIAL BIOPSY:  After consent, anesthesia with LEC and prep, tangential biopsy performed.  No complications and routine wound care.  May grow back and will get a scar. Based on lesion type may need to completely remove lesion. Patient will be notified in 7-10 days of results. Wound care directions given.    2) Neoplasm of uncertain behavior on right temple 1.0cm  Rule out ISK  TANGENTIAL BIOPSY:  After consent, anesthesia with LEC and prep, tangential biopsy performed.  No complications and routine wound care.  May grow back and will get a scar. Based on lesion type may need to completely remove lesion. Patient will be notified in 7-10 days of results. Wound care directions given.    3) actinic keratoses and solar elastosis  aks resolved  Wear a broad spectrum (covers UVA and UVB) sunscreen with at least SPF 30 on your face, ears, neck and V of the chest daily. Wear sunscreen on other areas of the body if those  areas are exposed to the sun throughout the day. Sunscreens can contain physical and/or chemical blockers. Physical blockers are less likely to clog pores, these include zinc oxide and titanium dioxide. Reapply every two hour and after swimming. Sunscreen examples include Neutrogena CeraVe, Blue Lizard, Elta MD and many others.    4) ISK x 2 on right cheek  Benign etiology and course of lesion.  LN2: Treated with LN2 for 5s for 1-2 cycles. Warned risks of blistering, pain, pigment change, scarring, and incomplete resolution.  Advised patient to return if lesions do not completely resolve within 2-3 months.  Wound care sheet given.    Follow up in [facility]      Again, thank you for allowing me to participate in the care of your patient.        Sincerely,        Kasandra Richards PA-C

## 2019-08-14 NOTE — PROGRESS NOTES
HPI:  Amelia Ozuna is a 68 year old female patient here today for follow up aks on left upper cutaneous lip and left suprabrow x 3. Patient states this has been present for a while.  Patient reports the following symptoms: ln2 with resolution. Has a spot on left arm recently started itching and it bothersome.  Patient reports the following previous treatments: none. Also has a spot on right temple that is very itchy and bothersome. Has had it frozen in the past with no improvement. .  Patient reports the following modifying factors: none.  Associated symptoms: none.  Patient has no other skin complaints today.  Remainder of the HPI, Meds, PMH, Allergies, FH, and SH was reviewed in chart.    Pertinent Hx:   No personal or family history of skin cancer    Past Medical History:   Diagnosis Date     Leiomyoma of uterus, unspecified 10/2000       Past Surgical History:   Procedure Laterality Date     C LIGATE FALLOPIAN TUBE,POSTPARTUM  1981    Tubal Ligation     C NONSPECIFIC PROCEDURE      Tonsillectomy     C NONSPECIFIC PROCEDURE      Oophorectomy benign serous cystadenoma (L)        Family History   Problem Relation Age of Onset     Alcohol/Drug Father      Alzheimer Disease Mother 92     Neurologic Disorder Sister         3-one sister with hydrocephalous     Family History Negative Brother         3     Breast Cancer No family hx of      Cancer - colorectal No family hx of        Social History     Socioeconomic History     Marital status:      Spouse name: Not on file     Number of children: Not on file     Years of education: Not on file     Highest education level: Not on file   Occupational History     Not on file   Social Needs     Financial resource strain: Not on file     Food insecurity:     Worry: Not on file     Inability: Not on file     Transportation needs:     Medical: Not on file     Non-medical: Not on file   Tobacco Use     Smoking status: Never Smoker     Smokeless tobacco: Never Used    Substance and Sexual Activity     Alcohol use: No     Drug use: No     Sexual activity: Yes     Partners: Male     Birth control/protection: Surgical     Comment: menopausal    Lifestyle     Physical activity:     Days per week: Not on file     Minutes per session: Not on file     Stress: Not on file   Relationships     Social connections:     Talks on phone: Not on file     Gets together: Not on file     Attends Jehovah's witness service: Not on file     Active member of club or organization: Not on file     Attends meetings of clubs or organizations: Not on file     Relationship status: Not on file     Intimate partner violence:     Fear of current or ex partner: Not on file     Emotionally abused: Not on file     Physically abused: Not on file     Forced sexual activity: Not on file   Other Topics Concern     Parent/sibling w/ CABG, MI or angioplasty before 65F 55M? No   Social History Narrative     Not on file       Outpatient Encounter Medications as of 8/14/2019   Medication Sig Dispense Refill     SESAME OIL Once in a day       buPROPion (WELLBUTRIN XL) 150 MG 24 hr tablet Take 1 tablet (150 mg) by mouth every morning (Patient not taking: Reported on 5/8/2019) 30 tablet 0     fluocin-hydroquinone-tretinoin (TRI-MARJ) 0.01-4-0.05 % CREA Apply to affected area on hands 1-2x a day for 3 months (Patient not taking: Reported on 8/14/2019) 30 g 1     No facility-administered encounter medications on file as of 8/14/2019.        Review Of Systems:  Skin: As above  Eyes: negative  Ears/Nose/Throat: negative  Respiratory: No shortness of breath, dyspnea on exertion, cough, or hemoptysis  Cardiovascular: negative  Gastrointestinal: negative  Genitourinary: negative  Musculoskeletal: negative  Neurologic: negative  Psychiatric: negative  Hematologic/Lymphatic/Immunologic: negative  Endocrine: negative      Objective:     There were no vitals taken for this visit.  Eyes: Conjunctivae/lids: Normal   ENT: Lips:  Normal  MSK:  Normal  Cardiovascular: Peripheral edema none  Pulm: Breathing Normal  Neuro/Psych: Orientation: Normal; Mood/Affect: Normal, NAD, WDWN  Pt accompanied by: self  Following areas examined: face, right frontal scalp, neck, forearms, left arm, and hands  Dan skin type:ii   Findings:  Inflamed brown, stuck-on scaly appearing papules on right temple 1.0cm  Light and medium brown smooth papule with irregular pigment network centrally. Terminal hairs within 0.5cm  Rhytides, hypo/hyperpigmentation, and atrophy of face  Inflamed brown, stuck-on scaly appearing papules on right cheek x 2  Rhytides, hypo/hyperpigmentation, and atrophy      Assessment and Plan:  1) Neoplasm of uncertain behavior on left upper arm 0.5cm  Rule out atypical nevus  TANGENTIAL BIOPSY:  After consent, anesthesia with LEC and prep, tangential biopsy performed.  No complications and routine wound care.  May grow back and will get a scar. Based on lesion type may need to completely remove lesion. Patient will be notified in 7-10 days of results. Wound care directions given.    2) Neoplasm of uncertain behavior on right temple 1.0cm  Rule out ISK  TANGENTIAL BIOPSY:  After consent, anesthesia with LEC and prep, tangential biopsy performed.  No complications and routine wound care.  May grow back and will get a scar. Based on lesion type may need to completely remove lesion. Patient will be notified in 7-10 days of results. Wound care directions given.    3) actinic keratoses and solar elastosis  aks resolved  Wear a broad spectrum (covers UVA and UVB) sunscreen with at least SPF 30 on your face, ears, neck and V of the chest daily. Wear sunscreen on other areas of the body if those areas are exposed to the sun throughout the day. Sunscreens can contain physical and/or chemical blockers. Physical blockers are less likely to clog pores, these include zinc oxide and titanium dioxide. Reapply every two hour and after swimming. Sunscreen examples  include Neutrogena, CeraVe, Blue Lizard, Elta MD and many others.    4) ISK x 2 on right cheek  Benign etiology and course of lesion.  LN2: Treated with LN2 for 5s for 1-2 cycles. Warned risks of blistering, pain, pigment change, scarring, and incomplete resolution.  Advised patient to return if lesions do not completely resolve within 2-3 months.  Wound care sheet given.    Follow up in [facility]

## 2019-08-14 NOTE — PATIENT INSTRUCTIONS
Proper skin care from [facility]:    -Eliminate harsh soaps as they strip the natural oils from the skin, often resulting in dry itchy skin ( i.e. Dial, Zest, Nikia Spring)  -Use mild soaps such as Cetaphil or Dove Sensitive Skin in the shower. You do not need to use soap on arms, legs, and trunk every time you shower unless visibly soiled.   -Avoid hot or cold showers.  -After showering, lightly dry off and apply moisturizing within 2-3 minutes. This will help trap moisture in the skin.   -Aggressive use of a moisturizer at least 1-2 times a day to the entire body (including -Vanicream, Cetaphil, Aquaphor or Cerave) and moisturize hands after every washing.  -We recommend using moisturizers that come in a tub that needs to be scooped out, not a pump. This has more of an oil base. It will hold moisture in your skin much better than a water base moisturizer. The above recommended are non-pore clogging.      Wear a sunscreen with at least SPF 30 on your face, ears, neck and V of the chest daily. Wear sunscreen on other areas of the body if those areas are exposed to the sun throughout the day. Sunscreens can contain physical and/or chemical blockers. Physical blockers are less likely to clog pores, these include zinc oxide and titanium dioxide. Reapply every two hour and after swimming. Sunscreen examples include Neutrogena, CeraVe, Blue Lizard, Elta MD and many others.    UV radiation  UVA radiation remains constant throughout the day and throughout the year. It is a longer wavelength than UVB and therefore penetrates deeper into the skin leading to immediate and delayed tanning, photoaging, and skin cancer. 70-80% of UVA and UVB radiation occurs between the hours of 10am-2pm.  UVB radiation  UVB radiation causes the most harmful effects and is more significant during the summer months. However, snow and ice can reflect UVB radiation leading to skin damage during the winter months as well. UVB radiation is  responsible for tanning, burning, inflammation, delayed erythema (pinkness), pigmentation (brown spots), and skin cancer.     WOUND CARE INSTRUCTIONS  FOR CRYOSURGERY        This area treated with liquid nitrogen will form a blister. You do not need to bandage the area until after the blister forms and breaks (which may be a few days).  When the blister breaks, begin daily dressing changes as follows:    1) Clean and dry the area with tap water using clean Q-tip or sterile gauze pad.    2) Apply Aquaphor, Vaseline, Polysporin ointment or Bacitracin ointment over entire wound.  Do NOT use Neosporin ointment.    3) Cover the wound with a band-aid or sterile non-stick gauze pad and micropore paper tape.      REPEAT THESE INSTRUCTIONS AT LEAST ONCE A DAY UNTIL THE WOUND HAS COMPLETELY HEALED.        It is an old wives tale that a wound heals better when it is exposed to air and allowed to dry out. The wound will heal faster with a better cosmetic result if it is kept moist with ointment and covered with a bandage.  Do not let the wound dry out.      Supplies Needed:     *Cotton tipped applicators (Q-tips)   *Aquaphor, Vaseline, Polysporin ointment or Bacitracin ointment (NOT NEOSPORIN)   *Band-aids, or non stick gauze pads and micropore paper tape    PATIENT INFORMATION    During the healing process you will notice a number of changes. All wounds develop a small halo of redness surrounding the wound.  This means healing is occurring. Severe itching with extensive redness usually indicates sensitivity to the ointment or bandage tape used to dress the wound.  You should call our office if this develops.      Swelling and/or discoloration around your surgical site is common, particularly when performed around the eye.    All wounds normally drain.  The larger the wound the more drainage there will be.  After 7-10 days, you will notice the wound beginning to shrink and new skin will begin to grow.  The wound is healed when  you can see skin has formed over the entire area.  A healed wound has a healthy, shiny look to the surface and is red to dark pink in color to normalize.  Wounds may take approximately 4-6 weeks to heal.  Larger wounds may take 6-8 weeks.  After the wound is healed you may discontinue dressing changes.    You may experience a sensation of tightness as your wound heals. This is normal and will gradually subside.    Your healed wound may be sensitive to temperature changes. This sensitivity improves with time, but if you re having a lot of discomfort, try to avoid temperature extremes.    Patients frequently experience itching after their wound appears to have healed because of the continue healing under the skin.  Plain Vaseline will help relieve the itching.                 Wound Care Instructions     FOR SUPERFICIAL WOUNDS     [facility] [phone]    [facility] [phone]          AFTER 24 HOURS YOU SHOULD REMOVE THE BANDAGE AND BEGIN DAILY DRESSING CHANGES AS FOLLOWS:     1) Remove Dressing.     2) Clean and dry the area with tap water using a Q-tip or sterile gauze pad.     3) Apply Vaseline, Aquaphor, Polysporin ointment or Bacitracin ointment over entire wound.  Do NOT use Neosporin ointment.     4) Cover the wound with a band-aid, or a sterile non-stick gauze pad and micropore paper tape      REPEAT THESE INSTRUCTIONS AT LEAST ONCE A DAY UNTIL THE WOUND HAS COMPLETELY HEALED.    It is an old wives tale that a wound heals better when it is exposed to air and allowed to dry out. The wound will heal faster with a better cosmetic result if it is kept moist with ointment and covered with a bandage.    **Do not let the wound dry out.**      Supplies Needed:      *Cotton tipped applicators (Q-tips)    *Polysporin Ointment or Bacitracin Ointment (NOT NEOSPORIN)    *Band-aids or non-stick gauze pads and micropore paper tape.      PATIENT INFORMATION:    During the healing process you  will notice a number of changes. All wounds develop a small halo of redness surrounding the wound.  This means healing is occurring. Severe itching with extensive redness usually indicates sensitivity to the ointment or bandage tape used to dress the wound.  You should call our office if this develops.      Swelling  and/or discoloration around your surgical site is common, particularly when performed around the eye.    All wounds normally drain.  The larger the wound the more drainage there will be.  After 7-10 days, you will notice the wound beginning to shrink and new skin will begin to grow.  The wound is healed when you can see skin has formed over the entire area.  A healed wound has a healthy, shiny look to the surface and is red to dark pink in color to normalize.  Wounds may take approximately 4-6 weeks to heal.  Larger wounds may take 6-8 weeks.  After the wound is healed you may discontinue dressing changes.    You may experience a sensation of tightness as your wound heals. This is normal and will gradually subside.    Your healed wound may be sensitive to temperature changes. This sensitivity improves with time, but if you re having a lot of discomfort, try to avoid temperature extremes.    Patients frequently experience itching after their wound appears to have healed because of the continue healing under the skin.  Plain Vaseline will help relieve the itching.        POSSIBLE COMPLICATIONS    BLEEDIN. Leave the bandage in place.  2. Use tightly rolled up gauze or a cloth to apply direct pressure over the bandage for 30  minutes.  3. Reapply pressure for an additional 30 minutes if necessary  4. Use additional gauze and tape to maintain pressure once the bleeding has stopped.

## 2019-08-16 ENCOUNTER — TELEPHONE (OUTPATIENT)
Dept: DERMATOLOGY | Facility: CLINIC | Age: 69
End: 2019-08-16

## 2019-08-16 LAB — COPATH REPORT: NORMAL

## 2019-08-16 NOTE — TELEPHONE ENCOUNTER
Called and LM for patient to call back in regards to biopsy results franki Cobos RN-BSN-PHN  [facility]  [phone]

## 2019-08-16 NOTE — TELEPHONE ENCOUNTER
----- Message from Kasandra Richards PA-C sent at 8/16/2019  2:50 PM CDT -----  A. Skin, left upper arm:   - Intradermal melanocytic nevus     B. Skin, right temple:   - Seborrheic keratosis, inflamed     This is a benign lesion that does not need any additional treatment.

## 2019-09-09 ENCOUNTER — TELEPHONE (OUTPATIENT)
Dept: FAMILY MEDICINE | Facility: CLINIC | Age: 69
End: 2019-09-09

## 2019-09-17 ENCOUNTER — OFFICE VISIT (OUTPATIENT)
Dept: FAMILY MEDICINE | Facility: CLINIC | Age: 69
End: 2019-09-17
Payer: COMMERCIAL

## 2019-09-17 VITALS
BODY MASS INDEX: 23.56 KG/M2 | WEIGHT: 138 LBS | HEIGHT: 64 IN | SYSTOLIC BLOOD PRESSURE: 124 MMHG | HEART RATE: 77 BPM | TEMPERATURE: 98 F | DIASTOLIC BLOOD PRESSURE: 60 MMHG | OXYGEN SATURATION: 97 % | RESPIRATION RATE: 17 BRPM

## 2019-09-17 DIAGNOSIS — M25.512 PAIN IN JOINT OF LEFT SHOULDER: Primary | ICD-10-CM

## 2019-09-17 DIAGNOSIS — R20.2 NUMBNESS AND TINGLING IN LEFT HAND: ICD-10-CM

## 2019-09-17 DIAGNOSIS — R20.0 NUMBNESS AND TINGLING IN LEFT HAND: ICD-10-CM

## 2019-09-17 DIAGNOSIS — R29.898 LEFT HAND WEAKNESS: ICD-10-CM

## 2019-09-17 DIAGNOSIS — M65.331 TRIGGER FINGER, RIGHT MIDDLE FINGER: ICD-10-CM

## 2019-09-17 PROCEDURE — 99213 OFFICE O/P EST LOW 20 MIN: CPT | Performed by: PHYSICIAN ASSISTANT

## 2019-09-17 ASSESSMENT — MIFFLIN-ST. JEOR: SCORE: 1135.96

## 2019-09-17 NOTE — PATIENT INSTRUCTIONS
(M25.512) Pain in joint of left shoulder  (primary encounter diagnosis)  Comment:   Plan:     (R20.0,  R20.2) Numbness and tingling in left hand  Comment:   Plan:     (R29.898) Left hand weakness  Comment:   Plan:     (M65.331) Trigger finger, right middle finger  Comment:   Plan:

## 2019-09-17 NOTE — PROGRESS NOTES
Subjective     Amelia Ozuna is a 68 year old female who presents to clinic today for the following health issues:    HPI   Patient is having numbness/tingling  in left arm that started a month ago. Has a history of being a .  Sleeps on left side.  Pain radiates from shoulder down arm.  States she drops things easily      Also, she has additional complaints of is seeing Dr. martel at [facility] for trigger finger and has had injections .  tHis has gotten worse and cant  anything with right hand.  Cant open jars anymore.  .              Current Outpatient Medications   Medication Sig Dispense Refill     buPROPion (WELLBUTRIN XL) 150 MG 24 hr tablet Take 1 tablet (150 mg) by mouth every morning (Patient not taking: Reported on 5/8/2019) 30 tablet 0     Cholecalciferol (VITAMIN D PO)        fluocin-hydroquinone-tretinoin (TRI-MARJ) 0.01-4-0.05 % CREA Apply to affected area on hands 1-2x a day for 3 months (Patient not taking: Reported on 8/14/2019) 30 g 1     MAGNESIUM PO        Multiple Vitamins-Minerals (ZINC PO)        SESAME OIL Once in a day       Recent Labs   Lab Test 06/01/18  1015 12/05/16  1111 11/09/15  1050 09/09/13  0903  05/29/13  1614   A1C 5.2  --  5.2  --   --   --    * 150* 119 119   < >  --    HDL 43* 61 71 63   < >  --    TRIG 125 159* 130 110   < >  --    ALT  --  26  --  44  --  28   CR  --  0.75  --   --   --  0.84   GFRESTIMATED  --  77  --   --   --  68   GFRESTBLACK  --  >90   GFR Calc    --   --   --  83   POTASSIUM  --  4.6  --   --   --  4.4   TSH  --   --  2.15  --   --  1.84    < > = values in this interval not displayed.      BP Readings from Last 3 Encounters:   09/17/19 124/60   08/14/19 124/81   05/08/19 119/74    Wt Readings from Last 3 Encounters:   09/17/19 62.6 kg (138 lb)   01/03/19 63.3 kg (139 lb 9.6 oz)   06/01/18 69.3 kg (152 lb 11.2 oz)                      Reviewed and updated as needed this visit by Provider         Review of Systems   ROS  "COMP: Constitutional, HEENT, cardiovascular, pulmonary, gi and gu systems are negative, except as otherwise noted.      Objective    /60 (BP Location: Right arm, Patient Position: Chair, Cuff Size: Adult Regular)   Pulse 77   Temp 98  F (36.7  C) (Oral)   Resp 17   Ht 1.626 m (5' 4\")   Wt 62.6 kg (138 lb)   SpO2 97%   BMI 23.69 kg/m    Body mass index is 23.69 kg/m .  Physical Exam   GENERAL: healthy, alert and no distress  RESP: lungs clear to auscultation - no rales, rhonchi or wheezes  CV: regular rate and rhythm, normal S1 S2, no S3 or S4, no murmur, click or rub, no peripheral edema and peripheral pulses strong  Left shoulder: full ROM but TTP in anterior shoulder.  strength weak.  Right hand: no  strength at all.     Diagnostic Test Results:  Labs reviewed in Epic        Assessment & Plan     1. Pain in joint of left shoulder  Advised follow-up with ortho for further eval   - SPORTS MEDICINE REFERRAL    2. Numbness and tingling in left hand    - SPORTS MEDICINE REFERRAL    3. Left hand weakness    - SPORTS MEDICINE REFERRAL    4. Trigger finger, right middle finger  Recommended TCO Dr. Rivera  - SPORTS MEDICINE REFERRAL           Return in about 1 year (around 9/17/2020).    Ramona Ann Aaseby-Aguilera, PA-C  [facility]        "

## 2019-09-24 ENCOUNTER — OFFICE VISIT (OUTPATIENT)
Dept: VASCULAR SURGERY | Facility: CLINIC | Age: 69
End: 2019-09-24
Payer: COMMERCIAL

## 2019-09-24 DIAGNOSIS — Z53.9 ERRONEOUS ENCOUNTER--DISREGARD: Primary | ICD-10-CM

## 2019-09-24 PROCEDURE — 99204 OFFICE O/P NEW MOD 45 MIN: CPT | Performed by: SURGERY

## 2019-09-24 NOTE — PROGRESS NOTES
[facility]    HPI:    Amelia Ozuna is a pleasant 68 year old female who presents with complaints of right lower extremity pain, swelling and varicose veins.  Varicose veins have been present for many years.  She is familiar to me, having had her left great saphenous vein ablated with phlebectomies in 2014.  The left leg is done well.    The patient has retired but says that her right leg varicosities have enlarged and that she is having significant swelling of her right lower extremity, especially when she flies to visit her mother in [place].  This swelling and pain have occurred despite the use of compression hose over several years.  She has not suffered complications of superficial thrombophlebitis, bleeding or deep vein thrombosis.  The pain is described as a burning pain, worse when standing for long periods of time and improving with elevation of the leg, compression hose and Tylenol on an as-needed basis.  The patient has used compression hose for years.      PAST MEDICAL HISTORY:   Past Medical History:   Diagnosis Date     Actinic keratosis      Leiomyoma of uterus, unspecified 10/2000       PAST SURGICAL HISTORY:   Past Surgical History:   Procedure Laterality Date     C LIGATE FALLOPIAN TUBE,POSTPARTUM  1981    Tubal Ligation     C NONSPECIFIC PROCEDURE      Tonsillectomy     C NONSPECIFIC PROCEDURE      Oophorectomy benign serous cystadenoma (L)   Left lower extremity great saphenous vein radiofrequency ablation with phlebectomies 2014    FAMILY HISTORY:   Family History   Problem Relation Age of Onset     Alcohol/Drug Father      Alzheimer Disease Mother 92     Neurologic Disorder Sister         3-one sister with hydrocephalous     Family History Negative Brother         3     Melanoma Brother      Breast Cancer No family hx of      Cancer - colorectal No family hx of        SOCIAL HISTORY:   Social History     Tobacco Use     Smoking status: Never Smoker     Smokeless tobacco: Never  Used   Substance Use Topics     Alcohol use: No       REVIEW OF SYSTEMS: Review Of Systems  Skin: negative  Eyes: negative  Ears/Nose/Throat: negative  Respiratory: No shortness of breath, dyspnea on exertion, cough, or hemoptysis  Cardiovascular: negative  Gastrointestinal: reflux  Genitourinary: negative  Musculoskeletal: Foot pain, right lower extremity pain and swelling  Neurologic: negative  Psychiatric: negative  Hematologic/Lymphatic/Immunologic: negative  Endocrine: negative      Vital signs:  There were no vitals taken for this visit.    Current Outpatient Medications   Medication Sig Dispense Refill     buPROPion (WELLBUTRIN XL) 150 MG 24 hr tablet Take 1 tablet (150 mg) by mouth every morning (Patient not taking: Reported on 5/8/2019) 30 tablet 0     Cholecalciferol (VITAMIN D PO)        fluocin-hydroquinone-tretinoin (TRI-MARJ) 0.01-4-0.05 % CREA Apply to affected area on hands 1-2x a day for 3 months (Patient not taking: Reported on 8/14/2019) 30 g 1     MAGNESIUM PO        Multiple Vitamins-Minerals (ZINC PO)        SESAME OIL Once in a day         PHYSICAL EXAM:  General: Pleasant, NAD.   HEENT: Normocephalic, atraumatic, external ears and nose normal.   Respiratory: Normal respiratory effort.   Cardiovascular: Pulse is regular.   Musculoskeletal: Gait and station normal.  The joints of her fingers and toes without deformity.  There is no cyanosis of her nailbeds.   EXTREMITIES: Right lower extremity: 6 mm varicosities coursing from just below the right knee down to the mid calf and then scattered down to the medial malleolus.  There is trace edema of right ankle but no significant venous stasis changes.  Left lower extremity: No significant varicose veins are appreciated.  There is area of scarring on the medial aspect of her left mid leg.  No venous stasis changes and there is no significant edema..    PULSES: R/L (3=normal pulse, 0=no palpable pulse) dorsalis pedis: 3/3; posterior tibial: 3/3.       Neurologic: Grossly normal  Psychiatric: Mood, affect, judgment and insight are elder;    ASSESSMENT:  CEAP 3 right lower extremity chronic venous insufficiency.  We discussed options of continued conservative management with use of compression hose, leg elevation, dietary measures and exercise.  She has been performing all these measures and says that the process has progressed and her symptoms are interfering with actives of daily living.  Risks of conservative measures including superficial thrombophlebitis, bleeding and worsening of the disease process were discussed.    PLAN:  Right lower extremity venous duplex ultrasound     Mauri Stoner MD

## 2019-09-25 ENCOUNTER — HOSPITAL ENCOUNTER (OUTPATIENT)
Dept: MAMMOGRAPHY | Facility: CLINIC | Age: 69
Discharge: HOME | End: 2019-09-25
Attending: FAMILY MEDICINE | Admitting: FAMILY MEDICINE
Payer: COMMERCIAL

## 2019-09-25 DIAGNOSIS — Z12.31 VISIT FOR SCREENING MAMMOGRAM: ICD-10-CM

## 2019-09-25 PROCEDURE — 77063 BREAST TOMOSYNTHESIS BI: CPT

## 2019-11-29 DIAGNOSIS — I83.891 VARICOSE VEINS OF LEG WITH EDEMA, RIGHT: ICD-10-CM

## 2019-11-29 DIAGNOSIS — I83.811 VARICOSE VEINS OF RIGHT LOWER EXTREMITY WITH PAIN: Primary | ICD-10-CM

## 2020-03-04 ENCOUNTER — TRANSFERRED RECORDS (OUTPATIENT)
Dept: HEALTH INFORMATION MANAGEMENT | Facility: CLINIC | Age: 70
End: 2020-03-04

## 2020-03-04 ENCOUNTER — ANCILLARY PROCEDURE (OUTPATIENT)
Dept: ULTRASOUND IMAGING | Facility: CLINIC | Age: 70
End: 2020-03-04
Attending: SURGERY
Payer: COMMERCIAL

## 2020-03-04 ENCOUNTER — OFFICE VISIT (OUTPATIENT)
Dept: VASCULAR SURGERY | Facility: CLINIC | Age: 70
End: 2020-03-04
Attending: SURGERY
Payer: COMMERCIAL

## 2020-03-04 DIAGNOSIS — I83.891 VARICOSE VEINS OF RIGHT LOWER EXTREMITY WITH OTHER COMPLICATIONS: Primary | ICD-10-CM

## 2020-03-04 DIAGNOSIS — I83.891 VARICOSE VEINS OF LEG WITH EDEMA, RIGHT: ICD-10-CM

## 2020-03-04 DIAGNOSIS — I83.811 VARICOSE VEINS OF RIGHT LOWER EXTREMITY WITH PAIN: ICD-10-CM

## 2020-03-04 PROCEDURE — 99213 OFFICE O/P EST LOW 20 MIN: CPT | Performed by: SURGERY

## 2020-03-04 PROCEDURE — 93971 EXTREMITY STUDY: CPT | Mod: RT | Performed by: SURGERY

## 2020-03-04 RX ORDER — LORAZEPAM 1 MG/1
TABLET ORAL
Refills: 0 | Status: CANCELLED | OUTPATIENT
Start: 2020-03-04

## 2020-03-04 RX ORDER — CLONIDINE HYDROCHLORIDE 0.1 MG/1
TABLET ORAL
Refills: 0 | Status: CANCELLED | OUTPATIENT
Start: 2020-03-04

## 2020-03-04 NOTE — LETTER
3/4/2020         RE: Amelia Ozuna  [address] [address] [id number]        Dear Colleague,    Thank you for referring your patient, Amelia Ozuna, [facility] Please see a copy of my visit note below.    [facility]    Amelia Ozuna presents in follow-up of right lower extremity pain and worsening varicose veins.  Please see my consultation from September 2019 for details.  The patient apologized for not returning sooner but states that she has been helping her ill mother who has since passed away.  She returns today for right lower extremity venous duplex ultrasound.    Physical Exam  General: Pleasant female in no acute distress.  Blood pressure 142/72, pulse 76 and regular  Extremities: Right lower extremity: 6 mm varicosity localized to the distal medial right thigh and then on the medial right calf standing down to the distal medial right ankle.  Trace edema of the right ankle but no stasis hyperpigmentation.    Left lower extremity: No significant varicose veins.    Ultrasound:  Right lower extremity deep veins: No evidence of deep vein thrombosis.  The common femoral and proximal femoral veins are incompetent but the remaining deep vein valves are competent.    Her right great saphenous vein is incompetent, measures 6.8 mm in diameter with reflux time of 4.8 seconds.  Is a source of multiple incompetent vein branches measuring up to 6.7 mm in diameter with reflux time of 3.2 seconds.    The right small saphenous vein is incompetent, measures 2.7 mm in diameter.    The Vein of Giacomini is incompetent but quite small.    The right and accessory saphenous vein is competent.    There are no significant incompetent perforators appreciated.    Assessment:  CEAP 3 right lower extremity chronic venous insufficiency recalcitrant to conservative measures.  This seems to be on the basis of right great saphenous vein incompetence.  Her right  small saphenous vein is incompetent but quite small and has no significant varicosities coursing from it.    The option of continued conservative measures with use of compression hose, leg elevation, dietary measures and exercise were discussed.  If she chose treatment, she would be a candidate for endovenous ablation of the right great saphenous vein.  We discussed details of radiofrequency ablation including risks of bleeding, infection, nerve injury, scarring, hyperpigmentation, deep vein thrombosis, recanalization of the great saphenous vein and recurrent varicose veins.    She understands that treatment of the great saphenous vein alone may not alleviate all of her symptoms or the visible varicose veins.  We discussed an adjunctive procedure to treat the varicosities either in the form of delayed sclerotherapy or concomitant phlebectomy.  Details of each including risks were discussed.  She feels that she would want to pursue concomitant phlebectomy with the understanding that this will not be covered by insurance and will be her responsibility.    Plan:  Radiofrequency ablation right great saphenous vein with concomitant phlebectomies.  Estimates given.  Questions answered.    Mauri Stoner MD            Again, thank you for allowing me to participate in the care of your patient.        Sincerely,        Mauri Stoner MD

## 2020-03-04 NOTE — PROGRESS NOTES
[facility] Note    Amelia Ozuna presents in follow-up of right lower extremity pain and worsening varicose veins.  Please see my consultation from September 2019 for details.  The patient apologized for not returning sooner but states that she has been helping her ill mother who has since passed away.  She returns today for right lower extremity venous duplex ultrasound.    Physical Exam  General: Pleasant female in no acute distress.  Blood pressure 142/72, pulse 76 and regular  Extremities: Right lower extremity: 6 mm varicosity localized to the distal medial right thigh and then on the medial right calf standing down to the distal medial right ankle.  Trace edema of the right ankle but no stasis hyperpigmentation.    Left lower extremity: No significant varicose veins.    Ultrasound:  Right lower extremity deep veins: No evidence of deep vein thrombosis.  The common femoral and proximal femoral veins are incompetent but the remaining deep vein valves are competent.    Her right great saphenous vein is incompetent, measures 6.8 mm in diameter with reflux time of 4.8 seconds.  Is a source of multiple incompetent vein branches measuring up to 6.7 mm in diameter with reflux time of 3.2 seconds.    The right small saphenous vein is incompetent, measures 2.7 mm in diameter.    The Vein of Giacomini is incompetent but quite small.    The right and accessory saphenous vein is competent.    There are no significant incompetent perforators appreciated.    Assessment:  CEAP 3 right lower extremity chronic venous insufficiency recalcitrant to conservative measures.  This seems to be on the basis of right great saphenous vein incompetence.  Her right small saphenous vein is incompetent but quite small and has no significant varicosities coursing from it.    The option of continued conservative measures with use of compression hose, leg elevation, dietary measures and exercise were discussed.  If she chose  treatment, she would be a candidate for endovenous ablation of the right great saphenous vein.  We discussed details of radiofrequency ablation including risks of bleeding, infection, nerve injury, scarring, hyperpigmentation, deep vein thrombosis, recanalization of the great saphenous vein and recurrent varicose veins.    She understands that treatment of the great saphenous vein alone may not alleviate all of her symptoms or the visible varicose veins.  We discussed an adjunctive procedure to treat the varicosities either in the form of delayed sclerotherapy or concomitant phlebectomy.  Details of each including risks were discussed.  She feels that she would want to pursue concomitant phlebectomy with the understanding that this will not be covered by insurance and will be her responsibility.    Plan:  Radiofrequency ablation right great saphenous vein with concomitant phlebectomies.  Estimates given.  Questions answered.    Mauri Stoner MD

## 2020-04-20 DIAGNOSIS — F33.0 MILD EPISODE OF RECURRENT MAJOR DEPRESSIVE DISORDER (H): ICD-10-CM

## 2020-04-20 RX ORDER — BUPROPION HYDROCHLORIDE 150 MG/1
TABLET ORAL
Qty: 30 TABLET | Refills: 0 | OUTPATIENT
Start: 2020-04-20

## 2020-04-20 NOTE — LETTER
April 20, 2020      Amelia Ozuna  [address] [address]        Dear Amelia,     We recently received a call from your pharmacy requesting a refill of your medication.   A review of your chart indicates that an appointment is required. Please contact our office at [phone] to schedule your virtual doctor's appointment.   We have authorized one refill of your medication to allow time for you to schedule your appointment.   Taking care of your health is important to us and ongoing visits with your provider are vital to your care. We look forward to seeing you in the near future.        Sincerely,        Ramona Ann Aaseby-Aguilera, PA-C/[facility]

## 2020-04-20 NOTE — TELEPHONE ENCOUNTER
Routing refill request to provider for review/approval because:  Labs not current:  PHQ/JANIA    PHQ 11/9/2015 6/18/2018   PHQ-9 Total Score 0 10   Q9: Thoughts of better off dead/self-harm past 2 weeks Not at all Not at all     JANIA-7 SCORE 11/29/2011 4/1/2013 9/9/2013   Total Score 8 0 0     Moon Castellanos RN, BSN

## 2020-05-12 ENCOUNTER — VIRTUAL VISIT (OUTPATIENT)
Dept: FAMILY MEDICINE | Facility: CLINIC | Age: 70
End: 2020-05-12
Payer: COMMERCIAL

## 2020-05-12 DIAGNOSIS — Z13.29 SCREENING FOR THYROID DISORDER: ICD-10-CM

## 2020-05-12 DIAGNOSIS — Z13.0 SCREENING FOR BLOOD DISEASE: ICD-10-CM

## 2020-05-12 DIAGNOSIS — F33.0 MILD EPISODE OF RECURRENT MAJOR DEPRESSIVE DISORDER (H): Primary | ICD-10-CM

## 2020-05-12 DIAGNOSIS — Z13.1 SCREENING FOR DIABETES MELLITUS: ICD-10-CM

## 2020-05-12 DIAGNOSIS — E78.5 HYPERLIPIDEMIA LDL GOAL <130: ICD-10-CM

## 2020-05-12 PROCEDURE — 99213 OFFICE O/P EST LOW 20 MIN: CPT | Mod: 95 | Performed by: PHYSICIAN ASSISTANT

## 2020-05-12 RX ORDER — BUPROPION HYDROCHLORIDE 150 MG/1
150 TABLET ORAL EVERY MORNING
Qty: 90 TABLET | Refills: 3 | Status: SHIPPED | OUTPATIENT
Start: 2020-05-12 | End: 2021-06-29

## 2020-05-12 ASSESSMENT — ANXIETY QUESTIONNAIRES
GAD7 TOTAL SCORE: 7
7. FEELING AFRAID AS IF SOMETHING AWFUL MIGHT HAPPEN: SEVERAL DAYS
2. NOT BEING ABLE TO STOP OR CONTROL WORRYING: SEVERAL DAYS
5. BEING SO RESTLESS THAT IT IS HARD TO SIT STILL: SEVERAL DAYS
1. FEELING NERVOUS, ANXIOUS, OR ON EDGE: SEVERAL DAYS
3. WORRYING TOO MUCH ABOUT DIFFERENT THINGS: SEVERAL DAYS
6. BECOMING EASILY ANNOYED OR IRRITABLE: SEVERAL DAYS
IF YOU CHECKED OFF ANY PROBLEMS ON THIS QUESTIONNAIRE, HOW DIFFICULT HAVE THESE PROBLEMS MADE IT FOR YOU TO DO YOUR WORK, TAKE CARE OF THINGS AT HOME, OR GET ALONG WITH OTHER PEOPLE: NOT DIFFICULT AT ALL

## 2020-05-12 ASSESSMENT — PATIENT HEALTH QUESTIONNAIRE - PHQ9
SUM OF ALL RESPONSES TO PHQ QUESTIONS 1-9: 6
5. POOR APPETITE OR OVEREATING: SEVERAL DAYS

## 2020-05-12 NOTE — PROGRESS NOTES
"Amelia Ozuna is a 69 year old female who is being evaluated via a billable video visit.      The patient has been notified of following:     \"This video visit will be conducted via a call between you and your physician/provider. We have found that certain health care needs can be provided without the need for an in-person physical exam.  This service lets us provide the care you need with a video conversation.  If a prescription is necessary we can send it directly to your pharmacy.  If lab work is needed we can place an order for that and you can then stop by our lab to have the test done at a later time.    Video visits are billed at different rates depending on your insurance coverage.  Please reach out to your insurance provider with any questions.    If during the course of the call the physician/provider feels a video visit is not appropriate, you will not be charged for this service.\"    Patient has given verbal consent for Video visit? Yes    How would you like to obtain your AVS? Mail a copy    Patient would like the video invitation sent by: Text to cell phone: [phone]    Will anyone else be joining your video visit? No        Subjective     Amelia Ozuna is a 69 year old female who presents today via video visit for the following health issues:    HPI  Medication Followup of Wellbutrin     Taking Medication as prescribed: yes    Side Effects:  None    Medication Helping Symptoms:  A little bit      Has been doing well in wellbutrin and would like a refill.     Video Start Time: 3:03 PM        Current Outpatient Medications   Medication Sig Dispense Refill     buPROPion (WELLBUTRIN XL) 150 MG 24 hr tablet Take 1 tablet (150 mg) by mouth every morning 90 tablet 3     buPROPion (WELLBUTRIN XL) 150 MG 24 hr tablet Take 1 tablet (150 mg) by mouth every morning 30 tablet 0     Cholecalciferol (VITAMIN D PO)        MAGNESIUM PO        Multiple Vitamins-Minerals (ZINC PO)        SESAME OIL Once in a " "day       fluocin-hydroquinone-tretinoin (TRI-MARJ) 0.01-4-0.05 % CREA Apply to affected area on hands 1-2x a day for 3 months (Patient not taking: Reported on 8/14/2019) 30 g 1     Recent Labs   Lab Test 06/01/18  1015 12/05/16  1111 11/09/15  1050 09/09/13  0903  05/29/13  1614   A1C 5.2  --  5.2  --   --   --    * 150* 119 119   < >  --    HDL 43* 61 71 63   < >  --    TRIG 125 159* 130 110   < >  --    ALT  --  26  --  44  --  28   CR  --  0.75  --   --   --  0.84   GFRESTIMATED  --  77  --   --   --  68   GFRESTBLACK  --  >90   GFR Calc    --   --   --  83   POTASSIUM  --  4.6  --   --   --  4.4   TSH  --   --  2.15  --   --  1.84    < > = values in this interval not displayed.      BP Readings from Last 3 Encounters:   09/17/19 124/60   08/14/19 124/81   05/08/19 119/74    Wt Readings from Last 3 Encounters:   09/17/19 62.6 kg (138 lb)   01/03/19 63.3 kg (139 lb 9.6 oz)   06/01/18 69.3 kg (152 lb 11.2 oz)                    Reviewed and updated as needed this visit by Provider         Review of Systems   Constitutional, HEENT, cardiovascular, pulmonary, gi and gu systems are negative, except as otherwise noted.      Objective    There were no vitals taken for this visit.  Estimated body mass index is 23.69 kg/m  as calculated from the following:    Height as of 9/17/19: 1.626 m (5' 4\").    Weight as of 9/17/19: 62.6 kg (138 lb).  Physical Exam     GENERAL: Healthy, alert and no distress  EYES: Eyes grossly normal to inspection.  No discharge or erythema, or obvious scleral/conjunctival abnormalities.  RESP: No audible wheeze, cough, or visible cyanosis.  No visible retractions or increased work of breathing.    SKIN: Visible skin clear. No significant rash, abnormal pigmentation or lesions.  NEURO: Cranial nerves grossly intact.  Mentation and speech appropriate for age.  PSYCH: Mentation appears normal, affect normal/bright, judgement and insight intact, normal speech and appearance " well-groomed.      Diagnostic Test Results:  Labs reviewed in Epic        Assessment & Plan     1. Mild episode of recurrent major depressive disorder (H)  Stable   - buPROPion (WELLBUTRIN XL) 150 MG 24 hr tablet; Take 1 tablet (150 mg) by mouth every morning  Dispense: 90 tablet; Refill: 3    2. Hyperlipidemia LDL goal <130    - Lipid panel reflex to direct LDL Fasting; Future    3. Screening for blood disease    - CBC with platelets; Future    4. Screening for diabetes mellitus    - Comprehensive metabolic panel; Future    5. Screening for thyroid disorder    - TSH with free T4 reflex; Future       FUTURE APPOINTMENTS:       - Schedule fasting labs in 3 months, then make appointment to review    No follow-ups on file.    Ramona Ann Aaseby-Aguilera, PA-C  [facility]      Video-Visit Details    Type of service:  Video Visit    Video End Time:3:10 PM    Originating Location (pt. Location): Home    Distant Location (provider location):  [facility]     Platform used for Video Visit: Doximity    No follow-ups on file.       Ramona Ann Aaseby-Aguilera, PA-C

## 2020-05-13 ASSESSMENT — ANXIETY QUESTIONNAIRES: GAD7 TOTAL SCORE: 7

## 2020-06-24 ENCOUNTER — OFFICE VISIT (OUTPATIENT)
Dept: FAMILY MEDICINE | Facility: CLINIC | Age: 70
End: 2020-06-24
Payer: COMMERCIAL

## 2020-06-24 VITALS
HEIGHT: 64 IN | RESPIRATION RATE: 18 BRPM | SYSTOLIC BLOOD PRESSURE: 130 MMHG | WEIGHT: 138 LBS | HEART RATE: 75 BPM | BODY MASS INDEX: 23.56 KG/M2 | DIASTOLIC BLOOD PRESSURE: 70 MMHG | TEMPERATURE: 98.1 F | OXYGEN SATURATION: 96 %

## 2020-06-24 DIAGNOSIS — Z13.1 SCREENING FOR DIABETES MELLITUS: ICD-10-CM

## 2020-06-24 DIAGNOSIS — Z13.29 SCREENING FOR THYROID DISORDER: ICD-10-CM

## 2020-06-24 DIAGNOSIS — E78.5 HYPERLIPIDEMIA LDL GOAL <130: ICD-10-CM

## 2020-06-24 DIAGNOSIS — G56.00 CARPAL TUNNEL SYNDROME, UNSPECIFIED LATERALITY: ICD-10-CM

## 2020-06-24 DIAGNOSIS — M65.331 TRIGGER FINGER, RIGHT MIDDLE FINGER: ICD-10-CM

## 2020-06-24 DIAGNOSIS — Z01.818 PREOP GENERAL PHYSICAL EXAM: Primary | ICD-10-CM

## 2020-06-24 DIAGNOSIS — Z13.0 SCREENING FOR BLOOD DISEASE: ICD-10-CM

## 2020-06-24 LAB
ERYTHROCYTE [DISTWIDTH] IN BLOOD BY AUTOMATED COUNT: 13.1 % (ref 10–15)
HCT VFR BLD AUTO: 41.8 % (ref 35–47)
HGB BLD-MCNC: 14.3 G/DL (ref 11.7–15.7)
MCH RBC QN AUTO: 30.6 PG (ref 26.5–33)
MCHC RBC AUTO-ENTMCNC: 34.2 G/DL (ref 31.5–36.5)
MCV RBC AUTO: 89 FL (ref 78–100)
PLATELET # BLD AUTO: 230 10E9/L (ref 150–450)
RBC # BLD AUTO: 4.68 10E12/L (ref 3.8–5.2)
WBC # BLD AUTO: 5.8 10E9/L (ref 4–11)

## 2020-06-24 PROCEDURE — 36415 COLL VENOUS BLD VENIPUNCTURE: CPT | Performed by: PHYSICIAN ASSISTANT

## 2020-06-24 PROCEDURE — 80061 LIPID PANEL: CPT | Performed by: PHYSICIAN ASSISTANT

## 2020-06-24 PROCEDURE — 85027 COMPLETE CBC AUTOMATED: CPT | Performed by: PHYSICIAN ASSISTANT

## 2020-06-24 PROCEDURE — 99214 OFFICE O/P EST MOD 30 MIN: CPT | Performed by: PHYSICIAN ASSISTANT

## 2020-06-24 PROCEDURE — 93000 ELECTROCARDIOGRAM COMPLETE: CPT | Performed by: PHYSICIAN ASSISTANT

## 2020-06-24 PROCEDURE — 80053 COMPREHEN METABOLIC PANEL: CPT | Performed by: PHYSICIAN ASSISTANT

## 2020-06-24 PROCEDURE — 84443 ASSAY THYROID STIM HORMONE: CPT | Performed by: PHYSICIAN ASSISTANT

## 2020-06-24 ASSESSMENT — MIFFLIN-ST. JEOR: SCORE: 1130.96

## 2020-06-24 NOTE — PROGRESS NOTES
[facility]  [facility]  [address] [facility] [address]  [phone]  Dept: [phone]    PRE-OP EVALUATION:  Today's date: 2020    Amelia Ozuna (: 1950) presents for pre-operative evaluation assessment as requested by Dr. Tobias .  She requires evaluation and anesthesia risk assessment prior to undergoing surgery/procedure for treatment of Right Hand  .    Proposed Surgery/ Procedure: Right Hand   Date of Surgery/ Procedure: 20  Time of Surgery/ Procedure: 12pm  Hospital/Surgical Facility: [facility]   Fax number for surgical facility:   Primary Physician: [facility]  Type of Anesthesia Anticipated: General    Patient has a Health Care Directive or Living Will:  NO    1. NO - Do you have a history of heart attack, stroke, stent, bypass or surgery on an artery in the head, neck, heart or legs?  2. NO - Do you ever have any pain or discomfort in your chest?  3. NO - Do you have a history of  Heart Failure?  4. NO - Are you troubled by shortness of breath when: walking on the level, up a slight hill or at night?  5. NO - Do you currently have a cold, bronchitis or other respiratory infection?  6. NO - Do you have a cough, shortness of breath or wheezing?  7. NO - Do you sometimes get pains in the calves of your legs when you walk?  8. NO - Do you or anyone in your family have previous history of blood clots?  9. NO - Do you or does anyone in your family have a serious bleeding problem such as prolonged bleeding following surgeries or cuts?  10. NO - Have you ever had problems with anemia or been told to take iron pills?  11. NO - Have you had any abnormal blood loss such as black, tarry or bloody stools, or abnormal vaginal bleeding?  12. NO - Have you ever had a blood transfusion?  13. NO - Have you or any of your relatives ever had problems with anesthesia?  14. NO - Do you have sleep apnea, excessive snoring or daytime drowsiness?  15. NO - Do you have any  prosthetic heart valves?  16. NO - Do you have prosthetic joints?  17. NO - Is there any chance that you may be pregnant?      HPI:     HPI related to upcoming procedure: right hand wrist pain chronic      See problem list for active medical problems.  Problems all longstanding and stable, except as noted/documented.  See ROS for pertinent symptoms related to these conditions.      MEDICAL HISTORY:     Patient Active Problem List    Diagnosis Date Noted     Seborrheic keratoses 06/01/2018     Priority: Medium     BPPV (benign paroxysmal positional vertigo) 08/21/2014     Priority: Medium     Advanced directives, counseling/discussion 11/29/2011     Priority: Medium     Advance Directive Problem List Overview:   Name Relationship Phone    Primary Health Care Agent            Alternative Health Care Agent          Discussed advance care planning with patient; information given to patient to review. 11/29/2011          HYPERLIPIDEMIA LDL GOAL <130 10/31/2010     Priority: Medium     Hypercholesterolemia 10/24/2008     Priority: Medium     Sjs per [place]        Past Medical History:   Diagnosis Date     Actinic keratosis      Leiomyoma of uterus, unspecified 10/2000     Past Surgical History:   Procedure Laterality Date     C LIGATE FALLOPIAN TUBE,POSTPARTUM  1981    Tubal Ligation     C NONSPECIFIC PROCEDURE      Tonsillectomy     C NONSPECIFIC PROCEDURE      Oophorectomy benign serous cystadenoma (L)     Current Outpatient Medications   Medication Sig Dispense Refill     buPROPion (WELLBUTRIN XL) 150 MG 24 hr tablet Take 1 tablet (150 mg) by mouth every morning 90 tablet 3     buPROPion (WELLBUTRIN XL) 150 MG 24 hr tablet Take 1 tablet (150 mg) by mouth every morning 30 tablet 0     Cholecalciferol (VITAMIN D PO)        fluocin-hydroquinone-tretinoin (TRI-MARJ) 0.01-4-0.05 % CREA Apply to affected area on hands 1-2x a day for 3 months (Patient not taking: Reported on 8/14/2019) 30 g 1     MAGNESIUM PO        Multiple  "Vitamins-Minerals (ZINC PO)        SESAME OIL Once in a day       OTC products: no recent use of OTC ASA, NSAIDS or Steroids and no use of herbal medications or other supplements    Allergies   Allergen Reactions     No Known Drug Allergies       Latex Allergy: NO    Social History     Tobacco Use     Smoking status: Never Smoker     Smokeless tobacco: Never Used   Substance Use Topics     Alcohol use: No     History   Drug Use No       REVIEW OF SYSTEMS:   CONSTITUTIONAL: NEGATIVE for fever, chills, change in weight  ENT/MOUTH: NEGATIVE for ear, mouth and throat problems  RESP: NEGATIVE for significant cough or SOB  CV: NEGATIVE for chest pain, palpitations or peripheral edema    EXAM:   Temp 98.1  F (36.7  C) (Oral)   Ht 1.626 m (5' 4\")   Wt 62.6 kg (138 lb)   BMI 23.69 kg/m      GENERAL APPEARANCE: healthy, alert and no distress     EYES: EOMI, PERRL     HENT: ear canals and TM's normal and nose and mouth without ulcers or lesions     NECK: no adenopathy, no asymmetry, masses, or scars and thyroid normal to palpation     RESP: lungs clear to auscultation - no rales, rhonchi or wheezes     CV: regular rates and rhythm, normal S1 S2, no S3 or S4 and no murmur, click or rub     ABDOMEN:  soft, nontender, no HSM or masses and bowel sounds normal     MS: extremities normal- no gross deformities noted, no evidence of inflammation in joints, FROM in all extremities.     SKIN: no suspicious lesions or rashes     NEURO: Normal strength and tone, sensory exam grossly normal, mentation intact and speech normal     PSYCH: mentation appears normal. and affect normal/bright     LYMPHATICS: No cervical adenopathy    DIAGNOSTICS:      EKG: appears normal, NSR, normal axis, normal intervals, no acute ST/T changes c/w ischemia, no LVH by voltage criteria, unchanged from previous tracings  Recent Labs   Lab Test 06/01/18  1015 12/05/16  1111 11/09/15  1050 05/29/13  1614   HGB  --   --  14.4 13.3   PLT  --   --  240 225   NA  -- "  142  --  143   POTASSIUM  --  4.6  --  4.4   CR  --  0.75  --  0.84   A1C 5.2  --  5.2  --       Results for orders placed or performed in visit on 06/24/20   TSH with free T4 reflex     Status: None   Result Value Ref Range    TSH 1.73 0.40 - 4.00 mU/L   Lipid panel reflex to direct LDL Fasting     Status: Abnormal   Result Value Ref Range    Cholesterol 239 (H) <200 mg/dL    Triglycerides 152 (H) <150 mg/dL    HDL Cholesterol 50 >49 mg/dL    LDL Cholesterol Calculated 159 (H) <100 mg/dL    Non HDL Cholesterol 189 (H) <130 mg/dL   Comprehensive metabolic panel     Status: None   Result Value Ref Range    Sodium 140 133 - 144 mmol/L    Potassium 4.5 3.4 - 5.3 mmol/L    Chloride 106 94 - 109 mmol/L    Carbon Dioxide 28 20 - 32 mmol/L    Anion Gap 6 3 - 14 mmol/L    Glucose 94 70 - 99 mg/dL    Urea Nitrogen 24 7 - 30 mg/dL    Creatinine 0.73 0.52 - 1.04 mg/dL    GFR Estimate 83 >60 mL/min/[1.73_m2]    GFR Estimate If Black >90 >60 mL/min/[1.73_m2]    Calcium 8.9 8.5 - 10.1 mg/dL    Bilirubin Total 0.5 0.2 - 1.3 mg/dL    Albumin 4.0 3.4 - 5.0 g/dL    Protein Total 7.0 6.8 - 8.8 g/dL    Alkaline Phosphatase 78 40 - 150 U/L    ALT 38 0 - 50 U/L    AST 20 0 - 45 U/L   CBC with platelets     Status: None   Result Value Ref Range    WBC 5.8 4.0 - 11.0 10e9/L    RBC Count 4.68 3.8 - 5.2 10e12/L    Hemoglobin 14.3 11.7 - 15.7 g/dL    Hematocrit 41.8 35.0 - 47.0 %    MCV 89 78 - 100 fl    MCH 30.6 26.5 - 33.0 pg    MCHC 34.2 31.5 - 36.5 g/dL    RDW 13.1 10.0 - 15.0 %    Platelet Count 230 150 - 450 10e9/L       IMPRESSION:   Reason for surgery/procedure: Right carpal tunnel release and trigger finger release    Diagnosis/reason for consult:  preoperative evaluation for assessment of cardiovascular and respiratory disease as well as overall risk assessment and perioperative medical management.      The proposed surgical procedure is considered LOW risk.    REVISED CARDIAC RISK INDEX  The patient has the following serious  cardiovascular risks for perioperative complications such as (MI, PE, VFib and 3  AV Block):  No serious cardiac risks  INTERPRETATION: 0 risks: Class I (very low risk - 0.4% complication rate)    The patient has the following additional risks for perioperative complications:  No identified additional risks      ICD-10-CM    1. Preop general physical exam  Z01.818    (Z01.818) Preop general physical exam  (primary encounter diagnosis)  Comment:   Plan: EKG 12-lead complete w/read - Clinics            (M65.331) Trigger finger, right middle finger  Comment:   Plan:     (G56.00) Carpal tunnel syndrome, unspecified laterality  Comment:   Plan:         RECOMMENDATIONS:     --Consult hospital rounder / IM to assist post-op medical management    --Patient is to take all scheduled medications on the day of surgery EXCEPT for modifications listed below.    APPROVAL GIVEN to proceed with proposed procedure, without further diagnostic evaluation       Signed Electronically by: Ramona Ann Aaseby-Aguilera, PA-C    Copy of this evaluation report is provided to requesting physician.    [facility] Preop Guidelines    Revised Cardiac Risk Index

## 2020-06-24 NOTE — LETTER
"July 2, 2020      Amelia Ozuna  [address] [facility] [address]        Dear ,    We are writing to inform you of your test results.    The results of your recent total cholesterol test were elevated.  Your total cholesterol should be less than 200.     The primary goal of therapy is the LDL or \"bad\" cholesterol.  Your LDL level was elevated.  Your LDL goal based on risk factors (i.e. smoking, family history, high blood pressure, low HDL cholesterol) and age is 130.     Your HDL, or \"good\" cholesterol is normal.  Your goal is an HDL level above 40 if you are male and 50 if you are female     Triglycerides are another cholesterol component that is associated with heart disease.  Normal triglycerides are less than 150.  Your triglyceride level is borderline.     I would recommend:   What lifestyle changes can I make to help improve my cholesterol levels?     Exercise regularly.   Exercise can raise HDL cholesterol levels and reduce levels of LDL cholesterol and triglycerides. If you haven't been exercising, try to work up to 30 minutes, 4 to 6 times a week.     Lose weight if you are overweight.   Being overweight can raise your cholesterol levels. Losing weight, even just 5 or 10 pounds, can lower your total cholesterol, LDL cholesterol and triglyceride levels.     Eat a heart-healthy diet.   Eat plenty of fresh fruits and vegetables. Fruits and vegetables are naturally low in fat. Not only do they add flavor and variety to your diet, but they are also the best source of fiber, vitamins and minerals for your body. Aim for 5 cups of fruits and vegetables every day, not counting potatoes, corn and rice. Potatoes, corn and rice count as carbohydrates.     Pick  good  fats over  bad  fats. Fat is part of a healthy diet, but you need to know the difference between  bad  fats and  good  fats. \"Bad  fats, such as saturated and trans fats, are found in foods such as butter; coconut and palm oil; " saturated or partially hydrogenated vegetable fats such as shortening and margarine; animal fats in meats; and fats in whole milk dairy products. Limit the amount of saturated fat in your diet, and avoid trans fat completely. Unsaturated fat is the  good  fat. Most fats in fish, vegetables, grains and tree nuts are unsaturated. Try to eat unsaturated fat in place of saturated fat. For example, you can use olive oil or canola oil in cooking instead of butter.     Use healthier cooking methods. Baking, broiling and roasting are the healthiest ways to prepare meat, poultry and other foods. Trim any outside fat or skin before cooking. Lean cuts can be pan-broiled or stir-fried. Use either a nonstick pan or nonstick cooking spray instead of adding fats such as butter or margarine. When eating out, ask how food is prepared. You can request that your food be baked, broiled or roasted, rather than fried.     Look for other sources of protein. Fish, dry beans, tree nuts, peas and lentils offer protein, nutrients and fiber without the cholesterol and saturated fats that meats have. Consider eating one  meatless  meal each week. Try substituting beans for meat in a favorite recipe, such as lasagna or chili. Snack on a handful of almonds or pecans. Soy is also an excellent source of protein. Good examples of soy include soy milk, edamame (green soy beans), tofu and soy protein shakes.     Get more fiber in your diet. Add good sources of fiber to your meals. Examples include fruits, vegetables, whole grains (such as oat bran, whole and rolled oats and barley), legumes (such as beans and peas) and nuts and seeds (such as ground flax seed). In addition to fiber, whole grains supply B-vitamins and important nutrients not found in foods made with white flour.     Eat more fish. Fish are an excellent source of omega-3 fatty acids. Wild-caught oily fish, such as salmon, tuna, mackerel and sardines, are the best sources of omega-3s,  but all fish contain some amount of this beneficial fatty acid. Aim for 2 6-oz servings each week.     The rest of your labs are within acceptable limits.  Resulted Orders   TSH with free T4 reflex   Result Value Ref Range    TSH 1.73 0.40 - 4.00 mU/L   Lipid panel reflex to direct LDL Fasting   Result Value Ref Range    Cholesterol 239 (H) <200 mg/dL      Comment:      Desirable:       <200 mg/dl    Triglycerides 152 (H) <150 mg/dL      Comment:      Borderline high:  150-199 mg/dl  High:             200-499 mg/dl  Very high:       >499 mg/dl  Fasting specimen      HDL Cholesterol 50 >49 mg/dL    LDL Cholesterol Calculated 159 (H) <100 mg/dL      Comment:      Above desirable:  100-129 mg/dl  Borderline High:  130-159 mg/dL  High:             160-189 mg/dL  Very high:       >189 mg/dl      Non HDL Cholesterol 189 (H) <130 mg/dL      Comment:      Above Desirable:  130-159 mg/dl  Borderline high:  160-189 mg/dl  High:             190-219 mg/dl  Very high:       >219 mg/dl     Comprehensive metabolic panel   Result Value Ref Range    Sodium 140 133 - 144 mmol/L    Potassium 4.5 3.4 - 5.3 mmol/L    Chloride 106 94 - 109 mmol/L    Carbon Dioxide 28 20 - 32 mmol/L    Anion Gap 6 3 - 14 mmol/L    Glucose 94 70 - 99 mg/dL      Comment:      Fasting specimen    Urea Nitrogen 24 7 - 30 mg/dL    Creatinine 0.73 0.52 - 1.04 mg/dL    GFR Estimate 83 >60 mL/min/[1.73_m2]      Comment:      Non  GFR Calc  Starting 12/18/2018, serum creatinine based estimated GFR (eGFR) will be   calculated using the Chronic Kidney Disease Epidemiology Collaboration   (CKD-EPI) equation.      GFR Estimate If Black >90 >60 mL/min/[1.73_m2]      Comment:       GFR Calc  Starting 12/18/2018, serum creatinine based estimated GFR (eGFR) will be   calculated using the Chronic Kidney Disease Epidemiology Collaboration   (CKD-EPI) equation.      Calcium 8.9 8.5 - 10.1 mg/dL    Bilirubin Total 0.5 0.2 - 1.3 mg/dL    Albumin  4.0 3.4 - 5.0 g/dL    Protein Total 7.0 6.8 - 8.8 g/dL    Alkaline Phosphatase 78 40 - 150 U/L    ALT 38 0 - 50 U/L    AST 20 0 - 45 U/L   CBC with platelets   Result Value Ref Range    WBC 5.8 4.0 - 11.0 10e9/L    RBC Count 4.68 3.8 - 5.2 10e12/L    Hemoglobin 14.3 11.7 - 15.7 g/dL    Hematocrit 41.8 35.0 - 47.0 %    MCV 89 78 - 100 fl    MCH 30.6 26.5 - 33.0 pg    MCHC 34.2 31.5 - 36.5 g/dL    RDW 13.1 10.0 - 15.0 %    Platelet Count 230 150 - 450 10e9/L       If you have any questions or concerns, please call the clinic at the number listed above.       Sincerely,      Ramona Ann Aaseby-Aguilera, PA-C

## 2020-06-25 LAB
ALBUMIN SERPL-MCNC: 4 G/DL (ref 3.4–5)
ALP SERPL-CCNC: 78 U/L (ref 40–150)
ALT SERPL W P-5'-P-CCNC: 38 U/L (ref 0–50)
ANION GAP SERPL CALCULATED.3IONS-SCNC: 6 MMOL/L (ref 3–14)
AST SERPL W P-5'-P-CCNC: 20 U/L (ref 0–45)
BILIRUB SERPL-MCNC: 0.5 MG/DL (ref 0.2–1.3)
BUN SERPL-MCNC: 24 MG/DL (ref 7–30)
CALCIUM SERPL-MCNC: 8.9 MG/DL (ref 8.5–10.1)
CHLORIDE SERPL-SCNC: 106 MMOL/L (ref 94–109)
CHOLEST SERPL-MCNC: 239 MG/DL
CO2 SERPL-SCNC: 28 MMOL/L (ref 20–32)
CREAT SERPL-MCNC: 0.73 MG/DL (ref 0.52–1.04)
GFR SERPL CREATININE-BSD FRML MDRD: 83 ML/MIN/{1.73_M2}
GLUCOSE SERPL-MCNC: 94 MG/DL (ref 70–99)
HDLC SERPL-MCNC: 50 MG/DL
LDLC SERPL CALC-MCNC: 159 MG/DL
NONHDLC SERPL-MCNC: 189 MG/DL
POTASSIUM SERPL-SCNC: 4.5 MMOL/L (ref 3.4–5.3)
PROT SERPL-MCNC: 7 G/DL (ref 6.8–8.8)
SODIUM SERPL-SCNC: 140 MMOL/L (ref 133–144)
TRIGL SERPL-MCNC: 152 MG/DL
TSH SERPL DL<=0.005 MIU/L-ACNC: 1.73 MU/L (ref 0.4–4)

## 2020-07-02 NOTE — RESULT ENCOUNTER NOTE
"Dear Amelia,    It was a pleasure to see you at your recent visit.      Patient Active Problem List:     Hypercholesterolemia     HYPERLIPIDEMIA LDL GOAL <130     Advanced directives, counseling/discussion     BPPV (benign paroxysmal positional vertigo)     Seborrheic keratoses        The results of your recent total cholesterol test were elevated.  Your total cholesterol should be less than 200.    The primary goal of therapy is the LDL or \"bad\" cholesterol.  Your LDL level was elevated.  Your LDL goal based on risk factors (i.e. smoking, family history, high blood pressure, low HDL cholesterol) and age is 130.    Your HDL, or \"good\" cholesterol is normal.  Your goal is an HDL level above 40 if you are male and 50 if you are female    Triglycerides are another cholesterol component that is associated with heart disease.  Normal triglycerides are less than 150.  Your   triglyceride level is borderline.    I would recommend:  What lifestyle changes can I make to help improve my cholesterol levels?     Exercise regularly.   Exercise can raise HDL cholesterol levels and reduce levels of LDL cholesterol and triglycerides. If you haven't been exercising, try to work up to 30 minutes, 4 to 6 times a week.     Lose weight if you are overweight.   Being overweight can raise your cholesterol levels. Losing weight, even just 5 or 10 pounds, can lower your total cholesterol, LDL cholesterol and triglyceride levels.     Eat a heart-healthy diet.   Eat plenty of fresh fruits and vegetables. Fruits and vegetables are naturally low in fat. Not only do they add flavor and variety to your diet, but they are also the best source of fiber, vitamins and minerals for your body. Aim for 5 cups of fruits and vegetables every day, not counting potatoes, corn and rice. Potatoes, corn and rice count as carbohydrates.     Pick  good  fats over  bad  fats. Fat is part of a healthy diet, but you need to know the difference between  bad  fats " "and  good  fats. \"Bad  fats, such as saturated and trans fats, are found in foods such as butter; coconut and palm oil; saturated or partially hydrogenated vegetable fats such as shortening and margarine; animal fats in meats; and fats in whole milk dairy products. Limit the amount of saturated fat in your diet, and avoid trans fat completely. Unsaturated fat is the  good  fat. Most fats in fish, vegetables, grains and tree nuts are unsaturated. Try to eat unsaturated fat in place of saturated fat. For example, you can use olive oil or canola oil in cooking instead of butter.     Use healthier cooking methods. Baking, broiling and roasting are the healthiest ways to prepare meat, poultry and other foods. Trim any outside fat or skin before cooking. Lean cuts can be pan-broiled or stir-fried. Use either a nonstick pan or nonstick cooking spray instead of adding fats such as butter or margarine. When eating out, ask how food is prepared. You can request that your food be baked, broiled or roasted, rather than fried.     Look for other sources of protein. Fish, dry beans, tree nuts, peas and lentils offer protein, nutrients and fiber without the cholesterol and saturated fats that meats have. Consider eating one  meatless  meal each week. Try substituting beans for meat in a favorite recipe, such as lasagna or chili. Snack on a handful of almonds or pecans. Soy is also an excellent source of protein. Good examples of soy include soy milk, edamame (green soy beans), tofu and soy protein shakes.     Get more fiber in your diet. Add good sources of fiber to your meals. Examples include fruits, vegetables, whole grains (such as oat bran, whole and rolled oats and barley), legumes (such as beans and peas) and nuts and seeds (such as ground flax seed). In addition to fiber, whole grains supply B-vitamins and important nutrients not found in foods made with white flour.     Eat more fish. Fish are an excellent source of " omega-3 fatty acids. Wild-caught oily fish, such as salmon, tuna, mackerel and sardines, are the best sources of omega-3s, but all fish contain some amount of this beneficial fatty acid. Aim for 2 6-oz servings each week.   .        The rest of your labs are within acceptable limits :     Results for orders placed or performed in visit on 06/24/20  -TSH with free T4 reflex     Status: None       Result                                            Value                         Ref Range                       TSH                                               1.73                          0.40 - 4.00 mU/L           -Lipid panel reflex to direct LDL Fasting     Status: Abnormal       Result                                            Value                         Ref Range                       Cholesterol                                       239 (H)                       <200 mg/dL                      Triglycerides                                     152 (H)                       <150 mg/dL                      HDL Cholesterol                                   50                            >49 mg/dL                       LDL Cholesterol Calculated                        159 (H)                       <100 mg/dL                      Non HDL Cholesterol                               189 (H)                       <130 mg/dL                 -Comprehensive metabolic panel     Status: None       Result                                            Value                         Ref Range                       Sodium                                            140                           133 - 144 mmol/L                Potassium                                         4.5                           3.4 - 5.3 mmol/L                Chloride                                          106                           94 - 109 mmol/L                 Carbon Dioxide                                    28                            20 - 32  mmol/L                  Anion Gap                                         6                             3 - 14 mmol/L                   Glucose                                           94                            70 - 99 mg/dL                   Urea Nitrogen                                     24                            7 - 30 mg/dL                    Creatinine                                        0.73                          0.52 - 1.04 mg/dL               GFR Estimate                                      83                            >60 mL/min/[1.73_m2]            GFR Estimate If Black                             >90                           >60 mL/min/[1.73_m2]            Calcium                                           8.9                           8.5 - 10.1 mg/dL                Bilirubin Total                                   0.5                           0.2 - 1.3 mg/dL                 Albumin                                           4.0                           3.4 - 5.0 g/dL                  Protein Total                                     7.0                           6.8 - 8.8 g/dL                  Alkaline Phosphatase                              78                            40 - 150 U/L                    ALT                                               38                            0 - 50 U/L                      AST                                               20                            0 - 45 U/L                 -CBC with platelets     Status: None       Result                                            Value                         Ref Range                       WBC                                               5.8                           4.0 - 11.0 10e9/L               RBC Count                                         4.68                          3.8 - 5.2 10e12/L               Hemoglobin                                        14.3                          11.7 - 15.7  g/dL                Hematocrit                                        41.8                          35.0 - 47.0 %                   MCV                                               89                            78 - 100 fl                     MCH                                               30.6                          26.5 - 33.0 pg                  MCHC                                              34.2                          31.5 - 36.5 g/dL                RDW                                               13.1                          10.0 - 15.0 %                   Platelet Count                                    230                           150 - 450 10e9/L               Thank you for choosing [facility]. We appreciate the opportunity to serve   you and look forward to supporting your healthcare needs in the future.    If you have any questions or concerns, please contact us at [phone]      Sincerely,        Ramona Aaseby-Aguilera PA-C          TEST DESCRIPTIONS   CBC (Complete Blood Coun  t) includes hemoglobin, hematocrit, white blood cells, etc. This test can be used to detect anemia, infection, and abnormalities in blood cells.   Cholesterol is one of the blood fats (lipids) and is the building block used by the body for cell wall and   hormone production. Increased levels of cholesterol have been proven to directly contribute to heart disease and strokes.   Triglycerides are one of the blood fats (lipids) and are thought to be associated with heart disease. If you have had anything to   eat or drink other than water for 12 hours before the test and your level is high, you should have the test repeated after a 12 hour fast. Abnormally high results may also be associated with diabetes, kidney and liver diseases.   LDL is the low density l  ipoprotein component of the cholesterol. It is the harmful substance that deposits cholesterol on the artery walls contributing to heart  "attacks and strokes. A high LDL level is associated with higher risk of coronary heart disease.   HDL stands for high   density lipoprotein and refers to the so-called \"good\" cholesterol. HDL picks up excess cholesterol in the bloodstream and carries it back to the liver for disposal. Individuals with higher than average HDL seem to have a lower risk of coronary disease.   Vigorous exercise will help increase the blood levels of HDL.   Risk Factor (Total cholesterol/HDL) is a commonly used ratio for cardiac risk assessment. Less than 5.0 for men and 4.4 for women is ideal.   Sodium is an electrolyte useful in diagnosis of   dehydration, diabetes, hypertension, or other diseases involving electrolyte imbalance. It also preserves the balance between calcium and potassium to maintain normal heart action and equilibrium of the body.   Potassium is also an electrolyte that work  s with sodium to regulate the body's water balance and normalize heart rhythm.   Glucose is a measure of blood sugar and is one of the tests for diabetes. If you have not been fasting, your level will often be high. A low glucose level may be a cause of   weakness or dizziness. Blood sugar ranks with cholesterol as a causative factor in arteriosclerosis and heart attacks.   BUN & Creatinine are waste products excreted by the kidneys. A high BUN can be related to a high protein diet, heavy exercise, fever   and infections, dehydration, kidney stones, and kidney disease. Creatinine elevation is less dependent on diet or exercise and better represents kidney impairment. Low values are not generally significant.   Calcium is a mineral in the blood controlled b  y the parathyroid glands and kidneys. It is important in the formation of bone, in muscle and nerve function, and in blood clotting. Disease of the parathyroid gland, diseased bones or kidneys, or defective absorption of calcium may cause abnormal levels   from the intestine.   ALT, AST, Alk " Phos are liver tests. Enzymes found in the liver as well as skeletal and cardiac muscle. Elevations can often be seen in alcoholism, liver or heart disease. Slightly abnormal values are not considered significant.   T  SH stands for Thyroid Stimulating Hormone. A sensitive test used to determine how the thyroid gland is functioning. The thyroid gland produces hormones that control the body's metabolism. When too few hormones are produced (increased TSH), hypothyroidism   occurs which can cause fatigue, sensitivity to cold, and weight gain - an overall slowing down of bodily functions. When too many thyroid hormones are produces (or decreased TSH), it creates a condition call hyperthyroidism (such as Graves' disease) whi  ch causes rapid heartbeat, weight loss, and dizziness, among other symptoms.   PSA stands for Prostate Specific Antigen. Elevated levels of PSA can increase with trauma, infection, inflammation, or disease processes in the prostate such as BPH (Benigh Pr  ostatic Hypertrophy) or cancer.   Glycohemoglobin or HgBA1C is a 3-month average of blood sugar.

## 2020-08-12 ENCOUNTER — DOCUMENTATION ONLY (OUTPATIENT)
Dept: VASCULAR SURGERY | Facility: CLINIC | Age: 70
End: 2020-08-12

## 2020-08-12 NOTE — PROGRESS NOTES
[id number] review documentation  I spoke with Dr. Deirdre Rutherford her [facility] who reviewed this case and felt that [facility] would not cover the procedure because there was no incompetence at the saphenofemoral junction.    This is indeed true, the patient has reflux in the thigh segment of the great saphenous vein, the great saphenous vein measures 6.8 mm in diameter with reflux time of 4.8 seconds.  A 6.7 mm incompetent vein branch courses from the incompetent segment of the thigh great saphenous vein.  Reflux time in the incompetent vein branch is 3.2 seconds.    Given the size of the great saphenous vein, the size of the incompetent vein branch coursing down the thigh and the fact that location of the reflux is the origin of the large incompetent tributary coursing down her thigh, treatment of the offending, symptomatic right great saphenous vein is clinically indicated.    She does have right small saphenous vein incompetence but I do not believe this is clinically significant as the vein is quite small.    Dr. Rutherford suggested that I speak with a vascular surgeon on appeal of this case.  This dictation is my request to heal this decision and discuss further.    CONSUELO Stoner MD    Dictated using Dragon voice recognition software which may result in transcription errors

## 2020-10-05 ENCOUNTER — HOSPITAL ENCOUNTER (OUTPATIENT)
Dept: MAMMOGRAPHY | Facility: CLINIC | Age: 70
Discharge: HOME | End: 2020-10-05
Attending: FAMILY MEDICINE | Admitting: FAMILY MEDICINE
Payer: COMMERCIAL

## 2020-10-05 DIAGNOSIS — Z12.31 VISIT FOR SCREENING MAMMOGRAM: ICD-10-CM

## 2020-10-05 PROCEDURE — 77067 SCR MAMMO BI INCL CAD: CPT

## 2020-10-23 ENCOUNTER — OFFICE VISIT (OUTPATIENT)
Dept: FAMILY MEDICINE | Facility: CLINIC | Age: 70
End: 2020-10-23
Payer: COMMERCIAL

## 2020-10-23 VITALS
DIASTOLIC BLOOD PRESSURE: 76 MMHG | HEART RATE: 76 BPM | WEIGHT: 144.6 LBS | SYSTOLIC BLOOD PRESSURE: 132 MMHG | RESPIRATION RATE: 16 BRPM | TEMPERATURE: 98.5 F | BODY MASS INDEX: 24.82 KG/M2

## 2020-10-23 DIAGNOSIS — R10.13 ABDOMINAL PAIN, EPIGASTRIC: Primary | ICD-10-CM

## 2020-10-23 DIAGNOSIS — Z23 NEED FOR PROPHYLACTIC VACCINATION AND INOCULATION AGAINST INFLUENZA: ICD-10-CM

## 2020-10-23 DIAGNOSIS — M79.605 PAIN OF LEFT LOWER EXTREMITY: ICD-10-CM

## 2020-10-23 DIAGNOSIS — M79.604 PAIN OF RIGHT LOWER EXTREMITY: ICD-10-CM

## 2020-10-23 LAB
ERYTHROCYTE [DISTWIDTH] IN BLOOD BY AUTOMATED COUNT: 13.4 % (ref 10–15)
HCT VFR BLD AUTO: 41.6 % (ref 35–47)
HGB BLD-MCNC: 14.3 G/DL (ref 11.7–15.7)
MCH RBC QN AUTO: 30.6 PG (ref 26.5–33)
MCHC RBC AUTO-ENTMCNC: 34.4 G/DL (ref 31.5–36.5)
MCV RBC AUTO: 89 FL (ref 78–100)
PLATELET # BLD AUTO: 233 10E9/L (ref 150–450)
RBC # BLD AUTO: 4.67 10E12/L (ref 3.8–5.2)
WBC # BLD AUTO: 7.6 10E9/L (ref 4–11)

## 2020-10-23 PROCEDURE — 85027 COMPLETE CBC AUTOMATED: CPT | Performed by: PHYSICIAN ASSISTANT

## 2020-10-23 PROCEDURE — 80053 COMPREHEN METABOLIC PANEL: CPT | Performed by: PHYSICIAN ASSISTANT

## 2020-10-23 PROCEDURE — 99214 OFFICE O/P EST MOD 30 MIN: CPT | Mod: 25 | Performed by: PHYSICIAN ASSISTANT

## 2020-10-23 PROCEDURE — 90471 IMMUNIZATION ADMIN: CPT | Performed by: PHYSICIAN ASSISTANT

## 2020-10-23 PROCEDURE — 36415 COLL VENOUS BLD VENIPUNCTURE: CPT | Performed by: PHYSICIAN ASSISTANT

## 2020-10-23 PROCEDURE — 90662 IIV NO PRSV INCREASED AG IM: CPT | Performed by: PHYSICIAN ASSISTANT

## 2020-10-23 ASSESSMENT — PATIENT HEALTH QUESTIONNAIRE - PHQ9
10. IF YOU CHECKED OFF ANY PROBLEMS, HOW DIFFICULT HAVE THESE PROBLEMS MADE IT FOR YOU TO DO YOUR WORK, TAKE CARE OF THINGS AT HOME, OR GET ALONG WITH OTHER PEOPLE: NOT DIFFICULT AT ALL
SUM OF ALL RESPONSES TO PHQ QUESTIONS 1-9: 5
SUM OF ALL RESPONSES TO PHQ QUESTIONS 1-9: 5

## 2020-10-23 ASSESSMENT — ANXIETY QUESTIONNAIRES
GAD7 TOTAL SCORE: 4
1. FEELING NERVOUS, ANXIOUS, OR ON EDGE: SEVERAL DAYS
2. NOT BEING ABLE TO STOP OR CONTROL WORRYING: NOT AT ALL
5. BEING SO RESTLESS THAT IT IS HARD TO SIT STILL: SEVERAL DAYS
6. BECOMING EASILY ANNOYED OR IRRITABLE: SEVERAL DAYS
4. TROUBLE RELAXING: SEVERAL DAYS
GAD7 TOTAL SCORE: 4
7. FEELING AFRAID AS IF SOMETHING AWFUL MIGHT HAPPEN: NOT AT ALL
GAD7 TOTAL SCORE: 4
7. FEELING AFRAID AS IF SOMETHING AWFUL MIGHT HAPPEN: NOT AT ALL
3. WORRYING TOO MUCH ABOUT DIFFERENT THINGS: NOT AT ALL

## 2020-10-23 NOTE — PATIENT INSTRUCTIONS
Check with pharmacy to see if you have coverage for Shingles Vaccine.    Follow up with physical therapy for legs.    Radiology will call to schedule the ultrasound.

## 2020-10-23 NOTE — PROGRESS NOTES
Subjective     Amelia Ozuna is a 69 year old female who presents to clinic today for the following health issues:    History of Present Illness       She eats 0-1 servings of fruits and vegetables daily.She consumes 0 sweetened beverage(s) daily.She exercises with enough effort to increase her heart rate 20 to 29 minutes per day.  She exercises with enough effort to increase her heart rate 5 days per week.   She is taking medications regularly.           Abdominal/Flank Pain  Onset/Duration: 1 week ago  Description:   Character: Fullness and Cramping  Location: catrachito-umbilical region  Radiation: None  Intensity: severe, 5-8/10  Progression of Symptoms:  improving and intermittent  Accompanying Signs & Symptoms:  Fever/Chills: no  Gas/Bloating: YES  Nausea: YES- sometimes   Vomitting: no  Diarrhea: no  Constipation: no  Dysuria or Hematuria: YES- painful   History:   Trauma: no  Previous similar pain: YES- when patient eats outside of the house(ex:  Restaurants) - She notes she has always had pain after eating greasy foods.  Previous tests done: none  Precipitating factors:   Does the pain change with:     Food: YES- greasy foods     Bowel Movement: no    Urination: no   Other factors:  no  Therapies tried and outcome: Pepto Bismol with improvement. With time passing the pain improves.    She has had endoscopy in the past.    Musculoskeletal problem/pain  Onset/Duration: long time   Description  Location: legs  - bilateral (along the outside of the legs to the midshin)  Joint Swelling: no  Redness: no  Pain: YES  Warmth: no  Intensity:  severe, 8/10, especially painful at night.   Progression of Symptoms:  improving and intermittent  Accompanying signs and symptoms:   Fevers: no  Numbness/tingling/weakness: no  History  Trauma to the area: YES- right knee from past injury.   Recent illness:  no  Previous similar problem: no  Previous evaluation:  no  Precipitating or alleviating factors:  Aggravating factors  "include: laying down at night  Pain is better when active  Therapies tried and outcome: acetaminophen. She has tried acupuncture, massage and hot tub.    She has varicose veins and needs surgery.    Annual Wellness Visit    Patient has been advised of split billing requirements and indicates understanding: Yes     Are you in the first 12 months of your Medicare Part B coverage?  No    Physical Health:    In general, how would you rate your overall physical health? excellent    Outside of work, how many days during the week do you exercise?6-7 days/week    Outside of work, approximately how many minutes a day do you exercise?15-30 minutes    If you drink alcohol do you typically have >3 drinks per day or >7 drinks per week? No    Do you usually eat at least 4 servings of fruit and vegetables a day, include whole grains & fiber and avoid regularly eating high fat or \"junk\" foods? NO    Do you have any problems taking medications regularly? No    Do you have any side effects from medications? none    Needs assistance for the following daily activities: no assistance needed    Which of the following safety concerns are present in your home?  lack of grab bars in the bathroom     Hearing impairment: No    In the past 6 months, have you been bothered by leaking of urine? no    Mental Health:    In general, how would you rate your overall mental or emotional health? good  PHQ-2 Score: (P) 2    Do you feel safe in your environment? YES    Have you ever done Advance Care Planning? (For example, a Health Directive, POLST, or a discussion with a medical provider or your loved ones about your wishes)? Yes, patient states has an Advance Care Planning document and will bring a copy to the clinic.    Fall risk:  Fallen 2 or more times in the past year?: No  Any fall with injury in the past year?: No  click delete button to remove this line now  Cognitive Screenin) Repeat 3 items (Leader, Season, Table)    2) Clock draw: " NORMAL  3) 3 item recall: Recalls 2 objects   Results: NORMAL clock, 1-2 items recalled: COGNITIVE IMPAIRMENT LESS LIKELY    Mini-CogTM Copyright [place]. Licensed by the author for use in [facility]; reprinted with permission (aleida@[facility]). All rights reserved.      Do you have sleep apnea, excessive snoring or daytime drowsiness?: no    Current providers sharing in care for this patient include:   Patient Care Team:  Clinic, [facility] as PCP - General  Aaseby-Aguilera, Ramona Ann, PA-C as Assigned PCP    Patient has been advised of split billing requirements and indicates understanding: Yes    Review of Systems   GENERAL:  No fevers  MUSCULOSKELETAL: As noted in HPI          Objective    /76 (BP Location: Right arm, Patient Position: Sitting, Cuff Size: Adult Regular)   Pulse 76   Temp 98.5  F (36.9  C) (Oral)   Resp 16   Wt 65.6 kg (144 lb 9.6 oz)   BMI 24.82 kg/m    Body mass index is 24.82 kg/m .  Physical Exam   GENERAL: No acute distress  HEENT: Normocephalic  GI: Active bowel sounds, abdomen is soft and non-tender. No hepatosplenomegaly.  : No CVA tenderness bilaterally.   EXTREMITIES:   Right lower extremity: Tenderness along lateral aspect of leg. 5/5 strength with flexion of the knee, 5/5 strength with extension of the knee, 5/5 strength with flexion of the hip, 5/5 strength with dorsiflexion, 5/5 strength with plantar flexion.  Left lower extremity: Tenderness along lateral aspect of leg. 5/5 strength with flexion of the knee, 5/5 strength with extension of the knee, 5/5 strength with flexion of the hip, 5/5 strength with dorsiflexion, 5/5 strength with plantar flexion.  NEURO: Alert and non-focal          Assessment & Plan     Abdominal pain, epigastric  Pain sounds like gallbladder. CMP, CBC and ultrasound ordered for further evaluation.  Continue with low fat diet.  If no signs of gallstones can consider a PPI  - Comprehensive metabolic panel (BMP + Alb, Alk Phos,  ALT, AST, Total. Bili, TP)  - CBC with platelets  - US Abdomen Limited; Future    Pain of right lower extremity  Physical therapy ordered for patient.  Pain could be neuropathy however she is having tenderness along the IT band. Perhaps PT will help. Follow up if not improving.  - [facility] REFERRAL; Future    Pain of left lower extremity  As noted above.  - [facility] REFERRAL; Future    Patient does not have Medicare insurance. Documentation for Annual Wellness left in chart, not charged.          Patient Instructions   Check with pharmacy to see if you have coverage for Shingles Vaccine.    Follow up with physical therapy for legs.    Radiology will call to schedule the ultrasound.      Return if symptoms worsen or fail to improve.    Latanya Moore PA-C  [facility]

## 2020-10-23 NOTE — NURSING NOTE
"Chief Complaint   Patient presents with     Abdominal Pain     Joint Pain     Flu Shot     Imm/Inj     Flu Shot     Initial /76 (BP Location: Right arm, Patient Position: Sitting, Cuff Size: Adult Regular)   Pulse 76   Temp 98.5  F (36.9  C) (Oral)   Resp 16   Wt 65.6 kg (144 lb 9.6 oz)   BMI 24.82 kg/m   Estimated body mass index is 24.82 kg/m  as calculated from the following:    Height as of 6/24/20: 1.626 m (5' 4\").    Weight as of this encounter: 65.6 kg (144 lb 9.6 oz).  BP completed using cuff size regular right arm    Lisa Magill, CMA    "

## 2020-10-23 NOTE — PROGRESS NOTES
Pre-Visit Planning :     Future Appointments   Date Time Provider Department Center   10/23/2020  3:30 PM Latanya Moore PA-C [facility]    11/18/2020  2:00 PM [facility]      Arrival Time for this Appointment:  3:10 PM      Appointment Notes for this encounter:   Stomach Pain/Joint Pain     Questionnaires Reviewed/Assigned:   YES=PHQ-9, JANIA-7    Spoke to patient via phone. Are there any additional questions or concerns you'd like to review with your provider during your visit? N/A      Last OV with provider:  6/24/2020; [facility]; Preop general physical exam, Trigger finger, right middle finger, Carpal tunnel syndrome, unspecified laterality, Screening for thyroid disorder, Hyperlipidemia LDL goal <130, Screening for diabetes mellitus, Screening for blood disease     Hospital ER Visits:  N/A    Is the visit preventive? No          Specialty Visits:  8/14/2019; [facility]; Dermatology; Kasandra Richards PA-C, Neoplasm of uncertain behavior of skin, Inflamed seborrheic keratosis, Solar elastosis, ,Actinic Keratosis                  Imaging and Lab Review:10/05/2020; SCREENING MAMMOGRAM, BILATERAL, DIGITAL w/CAD AND TOMOSYNTHESIS ; BREAST SYMPTOMS: No current breast complaints.;  IMPRESSION: BI-RADS CATEGORY: 1 -                   Negative.RECOMMENDED FOLLOW-UP: Annual Mammography.Recommend routine annual screening mammography             Recent Procedures:  6/8/2018; COLONOSCOPY;  [facility];  Dr. Lambert He; Findings: hemorrhoids.  Internal hemorrhoids w/o bleeding. Remainder of exam is normal.  IMPRESSION:  Internal                hemorrhoids.  Otherwise normal  Exam.  Please consider making a follow-up office appointment for treatment.  PLAN:  Repeat colonoscopy in 10 years.        MEDS ;    Current Outpatient Medications   Medication     buPROPion (WELLBUTRIN XL) 150 MG 24 hr tablet     buPROPion (WELLBUTRIN XL) 150 MG 24 hr  "tablet     Cholecalciferol (VITAMIN D PO)     fluocin-hydroquinone-tretinoin (TRI-MARJ) 0.01-4-0.05 % CREA     MAGNESIUM PO     Multiple Vitamins-Minerals (ZINC PO)     SESAME OIL     No current facility-administered medications for this visit.       Is there anything on your medication list that needs to be updated?  Ask @ check-in     Health Maintenance Due   Topic Date Due     ANNUAL REVIEW OF HM ORDERS  1950     ZOSTER IMMUNIZATION (2 of 3) 2010     MEDICARE ANNUAL WELLNESS VISIT  2019     FALL RISK ASSESSMENT  2019     INFLUENZA VACCINE (1) 2020     PHQ-9  2020     Preferred pharmacy: [id number] [place], [place] - [address] [facility] [address]    MyChart:  CODE !!!    Questionnaire Review :  If MyChart INACTIVE \"Please plan on arriving early so that you have time to complete your questionnaires prior to seeing your provider.\"       Patient preferred phone number: [phone]    Rosamaria Mendes, Registered Nurse, [facility]   [phone]    "

## 2020-10-23 NOTE — PROGRESS NOTES
Subjective     Amelia Ozuna is a 69 year old female who presents to clinic today for the following health issues:    HPI         {SUPERLIST (Optional):510389}  {additonal problems for provider to add (Optional):378923}    Review of Systems   {ROS COMP (Optional):249606}      Objective    There were no vitals taken for this visit.  There is no height or weight on file to calculate BMI.  Physical Exam   {Exam List (Optional):427176}    {Diagnostic Test Results (Optional):470964}        {PROVIDER CHARTING PREFERENCE:992724}    Answers for HPI/ROS submitted by the patient on 10/23/2020   If you checked off any problems, how difficult have these problems made it for you to do your work, take care of things at home, or get along with other people?: Not difficult at all  PHQ9 TOTAL SCORE: 5  JANIA 7 TOTAL SCORE: 4

## 2020-10-24 ASSESSMENT — ANXIETY QUESTIONNAIRES: GAD7 TOTAL SCORE: 4

## 2020-10-26 LAB
ALBUMIN SERPL-MCNC: 4.1 G/DL (ref 3.4–5)
ALP SERPL-CCNC: 91 U/L (ref 40–150)
ALT SERPL W P-5'-P-CCNC: 47 U/L (ref 0–50)
ANION GAP SERPL CALCULATED.3IONS-SCNC: 5 MMOL/L (ref 3–14)
AST SERPL W P-5'-P-CCNC: 27 U/L (ref 0–45)
BILIRUB SERPL-MCNC: 0.3 MG/DL (ref 0.2–1.3)
BUN SERPL-MCNC: 20 MG/DL (ref 7–30)
CALCIUM SERPL-MCNC: 9 MG/DL (ref 8.5–10.1)
CHLORIDE SERPL-SCNC: 105 MMOL/L (ref 94–109)
CO2 SERPL-SCNC: 28 MMOL/L (ref 20–32)
CREAT SERPL-MCNC: 0.64 MG/DL (ref 0.52–1.04)
GFR SERPL CREATININE-BSD FRML MDRD: 90 ML/MIN/{1.73_M2}
GLUCOSE SERPL-MCNC: 91 MG/DL (ref 70–99)
POTASSIUM SERPL-SCNC: 4.4 MMOL/L (ref 3.4–5.3)
PROT SERPL-MCNC: 7.2 G/DL (ref 6.8–8.8)
SODIUM SERPL-SCNC: 138 MMOL/L (ref 133–144)

## 2020-10-29 ENCOUNTER — TELEPHONE (OUTPATIENT)
Dept: FAMILY MEDICINE | Facility: CLINIC | Age: 70
End: 2020-10-29

## 2020-10-29 ENCOUNTER — HOSPITAL ENCOUNTER (OUTPATIENT)
Dept: ULTRASOUND IMAGING | Facility: CLINIC | Age: 70
Discharge: HOME | End: 2020-10-29
Attending: PHYSICIAN ASSISTANT | Admitting: PHYSICIAN ASSISTANT
Payer: COMMERCIAL

## 2020-10-29 DIAGNOSIS — R10.13 ABDOMINAL PAIN, EPIGASTRIC: ICD-10-CM

## 2020-10-29 PROCEDURE — 76705 ECHO EXAM OF ABDOMEN: CPT

## 2020-10-29 NOTE — TELEPHONE ENCOUNTER
Tried calling pt but unable to leave message.  Janie Harris, Latanya Carpenter PA-C   10/29/2020  2:22 PM      Please call patient and let her know that her ultrasound shows no signs of gallbladder disease, no stones or wall thickening. She does have some fatty liver (not uncommon and not the cause of her pain- nothing further needed for evaluating this). I would recommend she try omeprazole 20 mg daily for at least 2 weeks to see if this improves her symptoms. There is further testing that may be needed if the omeprazole does not improve her symptoms. At that point a referral to the GI doctors is needed

## 2020-10-29 NOTE — LETTER
[facility]  [address] [place], [address]  [phone]        November 5, 2020    Amelia Ozuna                                                                                                                                                       [id number] [facility] [address]            Dear Amelia,      Your ultrasound shows no signs of gallbladder disease, no stones or wall thickening. You do have some fatty liver (not uncommon and not the cause of your pain- nothing further needed for evaluating this). I would recommend you try omeprazole 20 mg daily for at least 2 weeks to see if this improves your symptoms. There is further testing that may be needed if the omeprazole does not improve your symptoms. At that point a referral to the GI doctors is needed      Latanya Moore PA-C

## 2020-10-30 ENCOUNTER — THERAPY VISIT (OUTPATIENT)
Dept: PHYSICAL THERAPY | Facility: CLINIC | Age: 70
End: 2020-10-30
Attending: PHYSICIAN ASSISTANT
Payer: COMMERCIAL

## 2020-10-30 DIAGNOSIS — M79.604 PAIN OF RIGHT LOWER EXTREMITY: ICD-10-CM

## 2020-10-30 DIAGNOSIS — M79.605 PAIN OF LEFT LOWER EXTREMITY: ICD-10-CM

## 2020-10-30 PROCEDURE — 97110 THERAPEUTIC EXERCISES: CPT | Mod: GP | Performed by: PHYSICAL THERAPIST

## 2020-10-30 PROCEDURE — 97161 PT EVAL LOW COMPLEX 20 MIN: CPT | Mod: GP | Performed by: PHYSICAL THERAPIST

## 2020-10-30 NOTE — PROGRESS NOTES
Physical Therapy Initial Evaluation   10/30/20    Precautions/Restrictions/MD instructions: Eval & Treat     Therapist Impression:   Pt is a 70 y/o female, with chronic history of bilateral leg pain (R>L). Pt presents with pain that responds to a directional preference of flexion, as well as weakness in hips/core consistent with stenosis. These impairments limit their ability to sit for prolonged periods of time, especially when traveling to [place]. Skilled PT services necessary in order to reduce impairments and improve independent function.    Subjective:   Chief Complaint: Bilateral leg pain (R>L)  JAYME: Patient reports that she has had pain in her legs for over 10 years, but has been managing it with injections to her low back. However, she has decided that she no longer wants to use cortisone injections and has sought out physical therapy for strengthening and stretches to improve her pain. The pain is worse with prolonged sitting, especially during trips to [place] to visit family.   DOI/onset: 10/23/20 (MD order) DOS: NA  Location: Lateral side of legs down to mid lower leg (R>L) Quality: sharp, shooting  Frequency: Intermittent Radiates: No tingling or numbness, just sharp/shooting pain down the legs   Pain scale: Rest 6/10 Activity 0/10   Time of day: Worse with sitting Sleeping: Not affected   Exacerbated by: prolonged sitting Relieved by: walking, knee to chest stretch Progression: Staying the same   Previous Treatment: Cortisone injections Effect of prior treatment: Good, but no longer wishes to pursue this type of treatment   PMH and/or surgical history: No significant PMH listed on health history form   Imaging: No imaging    Occupation: Retired Job duties: general housework   Current HEP/exercise regimen: Walking Patient's goals: Be able to sit for as long as she wants without pain  Medications: None listed on health history form  General health as reported by patient: Good  Return to MD: TBD  Red  "Flags: None to note    Objective Lumbar:    Posture: Unremarkable.    Gait: Unremarkbale.    SL Balance:  R: 30 sec   L: 30 sec     Functional:  - Squat: Good technique with sit>stand. No use of UEs for support. No dynamic valgus noted.       AROM: (Major, Moderate, Minimal or Nil loss)  Movement Loss James Mod Min Nil Pain   Flexion   x  Mild pain in low back/B LEs   Extension  x   Increased pain in B LEs   Side Gliding/Bend L  x   Pain/stretch on R   Side Gliding/Bend R  x   Increased pain down R LE       Neurological:    Motor Deficit:  Myotomes L R   L1-2 (hip flexion) 5/5 5/5   L3 (knee extension) 5/5 5/5   L4 (ankle DF) 5/5 5/5   L5 (g. toe ext) 5/5 5/5   S1 (ankle PF or knee flex) 5/5 5/5       Sensory Deficit: No deficit to light touch. Denies tingling, numbness, or burning sensations into bilateral LEs.     Dural Signs:   L R   Slump - -   SLR +, tightness in HS +, tightness in HS       Repeated movement testing/Directional Preference:   (During: produces, abolishes, increases, decreases, no effect, centralizing, peripheralizing; After: better, worse, no better, no worse, no effect, centralized, peripheralized)    Pre-test Symptoms Standing: No pain in standing   Symptoms During Symptoms After ROM increased ROM decreased No Effect   FIS Increased Worse   x   Rep FIS Decreased Better x     EIS Increased Worse   x   Rep EIS Increased, peripheralized Worse  x      Pre-test Symptoms Lying: Pain down lateral right thigh    Symptoms During Symptoms After ROM increased ROM decreased No Effect   PATITO Decreased Better x     Rep PATITO Decreased Better x     EIL ROLANDO: increased, did not want to stay in this position Worse   x   Rep EIL NT NT   x       Palpation: No TTP along lateral thighs, glutes, or lumbar paraspinals. Patient reports that it is a \"sharp, shooting\" pain and not painful to the touch.    Hip Screen:  Sidelying hip abd: R= 4/5 ; L= 4/5  FADIR: Negative bilaterally  DAT: Negative bilalterally    Other " Tests:   TA activation: Good  SI Testing: Negative for compression      Assessment/Plan:  Patient is a 69 year old female with lumbar complaints.    Patient has the following significant findings with corresponding treatment plan.                Diagnosis 1:  Bilateral leg pain consistent with stenosis  Pain -  hot/cold therapy, US, electric stimulation, mechanical traction, manual therapy, self management, education, directional preference exercise and home program  Decreased strength - therapeutic exercise, therapeutic activities and home program  Impaired muscle performance - neuro re-education and home program  Decreased function - therapeutic activities and home program    Therapy Evaluation Codes:   1) History comprised of:   Personal factors that impact the plan of care:      Age and Time since onset of symptoms.    Comorbidity factors that impact the plan of care are:      None.     Medications impacting care: None.  2) Examination of Body Systems comprised of:   Body structures and functions that impact the plan of care:      Lumbar spine.   Activity limitations that impact the plan of care are:      Bending and Sitting.  3) Clinical presentation characteristics are:   Stable/Uncomplicated.  4) Decision-Making    Low complexity using standardized patient assessment instrument and/or measureable assessment of functional outcome.  Cumulative Therapy Evaluation is: Low complexity.    Previous and current functional limitations:  (See Goal Flow Sheet for this information)    Short term and Long term goals: (See Goal Flow Sheet for this information)     Communication ability:  Patient appears to be able to clearly communicate and understand verbal and written communication and follow directions correctly.  Treatment Explanation - The following has been discussed with the patient:   RX ordered/plan of care  Anticipated outcomes  Possible risks and side effects  This patient would benefit from PT intervention to  resume normal activities.   Rehab potential is good.    Frequency:  1 X week, once daily  Duration:  for 8 weeks  Discharge Plan:  Achieve all LTG.  Independent in home treatment program.  Reach maximal therapeutic benefit.    Please refer to the daily flowsheet for treatment today, total treatment time and time spent performing 1:1 timed codes.

## 2020-11-12 ENCOUNTER — ALLIED HEALTH/NURSE VISIT (OUTPATIENT)
Dept: FAMILY MEDICINE | Facility: CLINIC | Age: 70
End: 2020-11-12
Payer: COMMERCIAL

## 2020-11-12 DIAGNOSIS — Z23 ENCOUNTER FOR IMMUNIZATION: Primary | ICD-10-CM

## 2020-11-12 PROCEDURE — 90471 IMMUNIZATION ADMIN: CPT

## 2020-11-12 PROCEDURE — 90750 HZV VACC RECOMBINANT IM: CPT

## 2021-01-25 ENCOUNTER — OFFICE VISIT (OUTPATIENT)
Dept: DERMATOLOGY | Facility: CLINIC | Age: 71
End: 2021-01-25
Payer: COMMERCIAL

## 2021-01-25 VITALS — OXYGEN SATURATION: 95 % | DIASTOLIC BLOOD PRESSURE: 71 MMHG | HEART RATE: 81 BPM | SYSTOLIC BLOOD PRESSURE: 125 MMHG

## 2021-01-25 DIAGNOSIS — D48.5 NEOPLASM OF UNCERTAIN BEHAVIOR OF SKIN: Primary | ICD-10-CM

## 2021-01-25 DIAGNOSIS — D18.01 CHERRY ANGIOMA: ICD-10-CM

## 2021-01-25 DIAGNOSIS — L82.0 INFLAMED SEBORRHEIC KERATOSIS: ICD-10-CM

## 2021-01-25 DIAGNOSIS — L81.4 LENTIGINES: ICD-10-CM

## 2021-01-25 DIAGNOSIS — Z80.8 FAMILY HX OF MELANOMA: ICD-10-CM

## 2021-01-25 DIAGNOSIS — L56.8 ACTINIC CHEILITIS: ICD-10-CM

## 2021-01-25 DIAGNOSIS — D22.9 MULTIPLE BENIGN NEVI: ICD-10-CM

## 2021-01-25 DIAGNOSIS — L82.1 SEBORRHEIC KERATOSES: ICD-10-CM

## 2021-01-25 DIAGNOSIS — L57.8 SOLAR ELASTOSIS: ICD-10-CM

## 2021-01-25 PROCEDURE — 11104 PUNCH BX SKIN SINGLE LESION: CPT | Performed by: PHYSICIAN ASSISTANT

## 2021-01-25 PROCEDURE — 17110 DESTRUCTION B9 LES UP TO 14: CPT | Mod: 59 | Performed by: PHYSICIAN ASSISTANT

## 2021-01-25 PROCEDURE — 88305 TISSUE EXAM BY PATHOLOGIST: CPT | Performed by: PATHOLOGY

## 2021-01-25 PROCEDURE — 99214 OFFICE O/P EST MOD 30 MIN: CPT | Mod: 25 | Performed by: PHYSICIAN ASSISTANT

## 2021-01-25 PROCEDURE — 17003 DESTRUCT PREMALG LES 2-14: CPT | Mod: 59 | Performed by: PHYSICIAN ASSISTANT

## 2021-01-25 PROCEDURE — 17000 DESTRUCT PREMALG LESION: CPT | Mod: 59 | Performed by: PHYSICIAN ASSISTANT

## 2021-01-25 NOTE — LETTER
1/25/2021         RE: Amelia Ozuna  [address] [facility] [id number]        Dear Colleague,    Thank you for referring your patient, Amelia Ozuna, to the [facility]. Please see a copy of my visit note below.    HPI:  Amelia Ozuna is a 70 year old female patient here today for FBE. Has a new spot on forehead .  Patient states this has been present for a while.  Patient reports the following symptoms: growing .  Patient reports the following previous treatments: none.  Patient reports the following modifying factors: none.  Associated symptoms: none. Also has some spots on lip. Treated in the past with ln2 with improvement..  Patient has no other skin complaints today.  Remainder of the HPI, Meds, PMH, Allergies, FH, and SH was reviewed in chart.    Pertinent Hx:   Actinic chelitis, brother had a MM  Past Medical History:   Diagnosis Date     Actinic keratosis      Leiomyoma of uterus, unspecified 10/2000       Past Surgical History:   Procedure Laterality Date     C LIGATE FALLOPIAN TUBE,POSTPARTUM  1981    Tubal Ligation     [facility] NONSPECIFIC PROCEDURE      Tonsillectomy     [facility] NONSPECIFIC PROCEDURE      Oophorectomy benign serous cystadenoma (L)        Family History   Problem Relation Age of Onset     Alcohol/Drug Father      Alzheimer Disease Mother 92     Neurologic Disorder Sister         3-one sister with hydrocephalous     Family History Negative Brother         3     Melanoma Brother      Breast Cancer No family hx of      Cancer - colorectal No family hx of        Social History     Socioeconomic History     Marital status:      Spouse name: Not on file     Number of children: Not on file     Years of education: Not on file     Highest education level: Not on file   Occupational History     Not on file   Social Needs     Financial resource strain: Not on file     Food insecurity     Worry: Not on file     Inability: Not on file      Transportation needs     Medical: Not on file     Non-medical: Not on file   Tobacco Use     Smoking status: Never Smoker     Smokeless tobacco: Never Used   Substance and Sexual Activity     Alcohol use: No     Drug use: No     Sexual activity: Yes     Partners: Male     Birth control/protection: Surgical     Comment: menopausal    Lifestyle     Physical activity     Days per week: Not on file     Minutes per session: Not on file     Stress: Not on file   Relationships     Social connections     Talks on phone: Not on file     Gets together: Not on file     Attends Protestant service: Not on file     Active member of club or organization: Not on file     Attends meetings of clubs or organizations: Not on file     Relationship status: Not on file     Intimate partner violence     Fear of current or ex partner: Not on file     Emotionally abused: Not on file     Physically abused: Not on file     Forced sexual activity: Not on file   Other Topics Concern     Parent/sibling w/ CABG, MI or angioplasty before 65F 55M? No   Social History Narrative     Not on file       Outpatient Encounter Medications as of 1/25/2021   Medication Sig Dispense Refill     buPROPion (WELLBUTRIN XL) 150 MG 24 hr tablet Take 1 tablet (150 mg) by mouth every morning 90 tablet 3     Cholecalciferol (VITAMIN D PO)        MAGNESIUM PO        Multiple Vitamins-Minerals (ZINC PO)        SESAME OIL Once in a day       No facility-administered encounter medications on file as of 1/25/2021.        Review Of Systems:  Skin: growth  Eyes: negative  Ears/Nose/Throat: negative  Respiratory: No shortness of breath, dyspnea on exertion, cough, or hemoptysis  Cardiovascular: negative  Gastrointestinal: negative  Genitourinary: negative  Musculoskeletal: negative  Neurologic: negative  Psychiatric: negative  Hematologic/Lymphatic/Immunologic: negative  Endocrine: negative      Objective:     /71   Pulse 81   SpO2 95%   Eyes: Conjunctivae/lids: Normal    ENT: Lips:  Normal  MSK: Normal  Cardiovascular: Peripheral edema none  Pulm: Breathing Normal  Neuro/Psych: Orientation: A/O x 3. Normal; Mood/Affect: Normal, NAD, WDWN  Pt accompanied by: self  Following areas examined: Scalp, face, eyelids, lips, neck, chest, abdomen, back, and R&L upper and lower extremities. Pt defers exam of buttock, hips, groin and genitals.   Dan skin type:ii   Findings:  Red smooth well-defined macules on trunk and extremities.  Brown, stuck-on scaly appearing papules on trunk and extremities.  Well circumscribed macules with symmetric color distribution on trunk and extremities.  Tan WD smooth macules on face, neck, trunk, and extremities.  Dilated pore on left superior forehead 0.2cm  Flesh colored and red 1.0mm scaly papules on inferior abdomen  Inflamed brown, stuck-on scaly appearing papules on right temple  White smooth macules x 2 on left lateral inferior lip    Assessment and Plan:     1) Cherry angiomas, Seborrheic keratoses, Benign nevi, Lentigines     I discussed the specifics of tumor, prognosis, and genetics of benign lesions.  I explained that treatment of these lesions would be purely cosmetic and not medically neccessary.  I discussed with patient different removal options including excision, cryotherapy, cautery and /or laser.  Lesion may recur and/or may not completely resolve. May need additional treatment.     2) Keratosis Pilaris:    Is a genetic rash that is considered common. Prevalent on the arms, upper legs, and  cheeks, it looks like small bumps. There is no cure, but there are some topical creams  that will help smooth out the bumps.   Wash body daily with a gentle cleanser( Dove, Cetaphil, or Vanicream) or just friction and water (use soap on underarms, groin, and feet).  Apply a thick emollient at least once a day. If showering apply emollient within 2-3 minutes of showering.   Consider AmLactin or Eucerin Plus twice daily(can purchase  over-the-counter) as a moisturizer to affected area.  Begin Tretinoin to affected area nightly. Can be very drying so make sure to moisturize.  Okay to use topical steroid on inflamed areas 2x a day for a short period of time (2-3 days). This can calm down some of the redness.  Side effects of topical steroids including but not limited to atrophy (skin thinning), striae (stretch marks) telangiectasias, steroid acne, and others. Do not apply to normal skin. Do not apply to discolored skin that does not have rash present.   Consider over the counter DERMAdoctor high potency daily body peel to affected areas.   3) Neoplasm of uncertain behavior left superior forehead 0.2cm  Rule out dilated pore  PUNCH BIOPSY:  After consent, anesthesia with LEC and prep, punch biopsy with a 0.2mm punch performed. 6.0 monocryl used for superficial closure. No complications and routine wound care.  May grow back and will get a scar. Based on lesion type may need to completely remove lesion. Patient will be notified in 7-10 days of results. Wound care directions given. All questions were answered. One interrupted stitch closure.     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 Discussed AE include hypopigmentation (white spot) and recurrence. Follow up in 2-3 months to recheck lesions. There is a risk of AKs developing into a SCC.   Treatment options include LN2 vs PDT vs Efudex. Pt elected LN2    5) ISK x 1  Benign etiology and course of lesion.  Signs and Symptoms of non-melanoma skin cancer and ABCDEs of melanoma reviewed with patient. Patient encouraged to perform monthly self skin exams and educated on how to perform them. UV precautions reviewed with patient. Patient was asked about new or changing moles/lesions on body.   Wear a sunscreen with at least SPF 30 on your face, ears, neck and V of the chest daily. Wear sunscreen on other areas of the body if those areas are exposed to the sun throughout the  day. Sunscreens can contain physical and/or chemical blockers. Physical blockers are less likely to clog pores, these include zinc oxide and titanium dioxide. Reapply every two hour and after swimming. Sunscreen examples include Neutrogena CeraVe, Blue Lizard, Elta MD and many others.    Proper skin care from [facility]:    -Eliminate harsh soaps as they strip the natural oils from the skin, often resulting in dry itchy skin ( i.e. Dial, Zest, Romanian Spring)  -Use mild soaps such as Cetaphil or Dove Sensitive Skin in the shower. You do not need to use soap on arms, legs, and trunk every time you shower unless visibly soiled.   -Avoid hot or cold showers.  -After showering, lightly dry off and apply moisturizing within 2-3 minutes. This will help trap moisture in the skin.   -Aggressive use of a moisturizer at least 1-2 times a day to the entire body (including -Vanicream, Cetaphil, Aquaphor or Cerave) and moisturize hands after every washing.  -We recommend using moisturizers that come in a tub that needs to be scooped out, not a pump. This has more of an oil base. It will hold moisture in your skin much better than a water base moisturizer. The above recommended are non-pore clogging.               Follow up in yearly FBE. Two months if actinic chelitis does not resolve        Again, thank you for allowing me to participate in the care of your patient.        Sincerely,        Kasandra Richards PA-C

## 2021-01-25 NOTE — PROGRESS NOTES
HPI:  Amelia Ozuna is a 70 year old female patient here today for FBE. Has a new spot on forehead .  Patient states this has been present for a while.  Patient reports the following symptoms: growing .  Patient reports the following previous treatments: none.  Patient reports the following modifying factors: none.  Associated symptoms: none. Also has some spots on lip. Treated in the past with ln2 with improvement..  Patient has no other skin complaints today.  Remainder of the HPI, Meds, PMH, Allergies, FH, and SH was reviewed in chart.    Pertinent Hx:   Actinic chelitis, brother had a MM  Past Medical History:   Diagnosis Date     Actinic keratosis      Leiomyoma of uterus, unspecified 10/2000       Past Surgical History:   Procedure Laterality Date     C LIGATE FALLOPIAN TUBE,POSTPARTUM  1981    Tubal Ligation     ZZC NONSPECIFIC PROCEDURE      Tonsillectomy     ZZC NONSPECIFIC PROCEDURE      Oophorectomy benign serous cystadenoma (L)        Family History   Problem Relation Age of Onset     Alcohol/Drug Father      Alzheimer Disease Mother 92     Neurologic Disorder Sister         3-one sister with hydrocephalous     Family History Negative Brother         3     Melanoma Brother      Breast Cancer No family hx of      Cancer - colorectal No family hx of        Social History     Socioeconomic History     Marital status:      Spouse name: Not on file     Number of children: Not on file     Years of education: Not on file     Highest education level: Not on file   Occupational History     Not on file   Social Needs     Financial resource strain: Not on file     Food insecurity     Worry: Not on file     Inability: Not on file     Transportation needs     Medical: Not on file     Non-medical: Not on file   Tobacco Use     Smoking status: Never Smoker     Smokeless tobacco: Never Used   Substance and Sexual Activity     Alcohol use: No     Drug use: No     Sexual activity: Yes     Partners: Male      Birth control/protection: Surgical     Comment: menopausal    Lifestyle     Physical activity     Days per week: Not on file     Minutes per session: Not on file     Stress: Not on file   Relationships     Social connections     Talks on phone: Not on file     Gets together: Not on file     Attends Faith service: Not on file     Active member of club or organization: Not on file     Attends meetings of clubs or organizations: Not on file     Relationship status: Not on file     Intimate partner violence     Fear of current or ex partner: Not on file     Emotionally abused: Not on file     Physically abused: Not on file     Forced sexual activity: Not on file   Other Topics Concern     Parent/sibling w/ CABG, MI or angioplasty before 65F 55M? No   Social History Narrative     Not on file       Outpatient Encounter Medications as of 1/25/2021   Medication Sig Dispense Refill     buPROPion (WELLBUTRIN XL) 150 MG 24 hr tablet Take 1 tablet (150 mg) by mouth every morning 90 tablet 3     Cholecalciferol (VITAMIN D PO)        MAGNESIUM PO        Multiple Vitamins-Minerals (ZINC PO)        SESAME OIL Once in a day       No facility-administered encounter medications on file as of 1/25/2021.        Review Of Systems:  Skin: growth  Eyes: negative  Ears/Nose/Throat: negative  Respiratory: No shortness of breath, dyspnea on exertion, cough, or hemoptysis  Cardiovascular: negative  Gastrointestinal: negative  Genitourinary: negative  Musculoskeletal: negative  Neurologic: negative  Psychiatric: negative  Hematologic/Lymphatic/Immunologic: negative  Endocrine: negative      Objective:     /71   Pulse 81   SpO2 95%   Eyes: Conjunctivae/lids: Normal   ENT: Lips:  Normal  MSK: Normal  Cardiovascular: Peripheral edema none  Pulm: Breathing Normal  Neuro/Psych: Orientation: A/O x 3. Normal; Mood/Affect: Normal, NAD, WDWN  Pt accompanied by: self  Following areas examined: Scalp, face, eyelids, lips, neck, chest, abdomen,  back, and R&L upper and lower extremities. Pt defers exam of buttock, hips, groin and genitals.   Dan skin type:ii   Findings:  Red smooth well-defined macules on trunk and extremities.  Brown, stuck-on scaly appearing papules on trunk and extremities.  Well circumscribed macules with symmetric color distribution on trunk and extremities.  Tan WD smooth macules on face, neck, trunk, and extremities.  Dilated pore on left superior forehead 0.2cm  Flesh colored and red 1.0mm scaly papules on inferior abdomen  Inflamed brown, stuck-on scaly appearing papules on right temple  White smooth macules x 2 on left lateral inferior lip    Assessment and Plan:     1) Cherry angiomas, Seborrheic keratoses, Benign nevi, Lentigines     I discussed the specifics of tumor, prognosis, and genetics of benign lesions.  I explained that treatment of these lesions would be purely cosmetic and not medically neccessary.  I discussed with patient different removal options including excision, cryotherapy, cautery and /or laser.  Lesion may recur and/or may not completely resolve. May need additional treatment.     2) Keratosis Pilaris:    Is a genetic rash that is considered common. Prevalent on the arms, upper legs, and  cheeks, it looks like small bumps. There is no cure, but there are some topical creams  that will help smooth out the bumps.   Wash body daily with a gentle cleanser( Dove, Cetaphil, or Vanicream) or just friction and water (use soap on underarms, groin, and feet).  Apply a thick emollient at least once a day. If showering apply emollient within 2-3 minutes of showering.   Consider AmLactin or Eucerin Plus twice daily(can purchase over-the-counter) as a moisturizer to affected area.  Begin Tretinoin to affected area nightly. Can be very drying so make sure to moisturize.  Okay to use topical steroid on inflamed areas 2x a day for a short period of time (2-3 days). This can calm down some of the redness.  Side  effects of topical steroids including but not limited to atrophy (skin thinning), striae (stretch marks) telangiectasias, steroid acne, and others. Do not apply to normal skin. Do not apply to discolored skin that does not have rash present.   Consider over the counter DERMAdoctor high potency daily body peel to affected areas.   3) Neoplasm of uncertain behavior left superior forehead 0.2cm  Rule out dilated pore  PUNCH BIOPSY:  After consent, anesthesia with LEC and prep, punch biopsy with a 0.2mm punch performed. 6.0 monocryl used for superficial closure. No complications and routine wound care.  May grow back and will get a scar. Based on lesion type may need to completely remove lesion. Patient will be notified in 7-10 days of results. Wound care directions given. All questions were answered. One interrupted stitch closure.     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 Discussed AE include hypopigmentation (white spot) and recurrence. Follow up in 2-3 months to recheck lesions. There is a risk of AKs developing into a SCC.   Treatment options include LN2 vs PDT vs Efudex. Pt elected LN2    5) ISK x 1  Benign etiology and course of lesion.  Signs and Symptoms of non-melanoma skin cancer and ABCDEs of melanoma reviewed with patient. Patient encouraged to perform monthly self skin exams and educated on how to perform them. UV precautions reviewed with patient. Patient was asked about new or changing moles/lesions on body.   Wear a sunscreen with at least SPF 30 on your face, ears, neck and V of the chest daily. Wear sunscreen on other areas of the body if those areas are exposed to the sun throughout the day. Sunscreens can contain physical and/or chemical blockers. Physical blockers are less likely to clog pores, these include zinc oxide and titanium dioxide. Reapply every two hour and after swimming. Sunscreen examples include Neutrogena, CeraVe, Blue Lizard, Elta MD and many  others.    Proper skin care from [facility]:    -Eliminate harsh soaps as they strip the natural oils from the skin, often resulting in dry itchy skin ( i.e. Dial, Zest, Nikia Spring)  -Use mild soaps such as Cetaphil or Dove Sensitive Skin in the shower. You do not need to use soap on arms, legs, and trunk every time you shower unless visibly soiled.   -Avoid hot or cold showers.  -After showering, lightly dry off and apply moisturizing within 2-3 minutes. This will help trap moisture in the skin.   -Aggressive use of a moisturizer at least 1-2 times a day to the entire body (including -Vanicream, Cetaphil, Aquaphor or Cerave) and moisturize hands after every washing.  -We recommend using moisturizers that come in a tub that needs to be scooped out, not a pump. This has more of an oil base. It will hold moisture in your skin much better than a water base moisturizer. The above recommended are non-pore clogging.               Follow up in yearly FBE. Two months if actinic chelitis does not resolve

## 2021-01-25 NOTE — PATIENT INSTRUCTIONS
Keratosis Pilaris:    Is a genetic rash that is considered common. Prevalent on the arms, upper legs, and  cheeks, it looks like small bumps. There is no cure, but there are some topical creams  that will help smooth out the bumps.   Wash body daily with a gentle cleanser( Dove, Cetaphil, or Vanicream) or just friction and water (use soap on underarms, groin, and feet).  Apply a thick emollient at least once a day. If showering apply emollient within 2-3 minutes of showering.   Consider AmLactin or Eucerin Plus twice daily(can purchase over-the-counter) as a moisturizer to affected area.  Begin Tretinoin to affected area nightly. Can be very drying so make sure to moisturize.  Okay to use topical steroid on inflamed areas 2x a day for a short period of time (2-3 days). This can calm down some of the redness.  Side effects of topical steroids including but not limited to atrophy (skin thinning), striae (stretch marks) telangiectasias, steroid acne, and others. Do not apply to normal skin. Do not apply to discolored skin that does not have rash present.   Consider over the counter DERMAdoctor high potency daily body peel to affected areas.     WOUND CARE INSTRUCTIONS  for  ONE WEEK AFTER SURGERY          1) Leave flat bandage on your skin for one week after today s bandage change.  2) In one week when you remove the bandage, you may resume your regular skin care routine, including washing with mild soap and water, applying moisturizer, make-up and sunscreen.    3) If there are any open or bleeding areas at the incision/graft site you should begin to cover the area with a bandage daily as follows:    1) Clean and dry the area with plain tap water using a Q-tip or sterile gauze pad.  2) Apply Polysporin or Bacitracin ointment to the open area.  3) Cover the wound with a band-aid or a sterile non-stick gauze pad and micropore paper tape.         SIGNS OF INFECTION  - If you notice any of these signs of infection, call  your doctor right away: expanding redness around the wound.  - Yellow or greenish-colored pus or cloudy wound drainage.    - Red streaking spreading from the wound.  - Increased swelling, tenderness, or pain around the wound.   - Fever.    Please remember that yellow and clear drainage from a wound can be normal and related to normal wound healing.  Isolated drainage from a wound without a combination of the above features does not indicate infection.       *Once the bandages are removed, the scar will be red and firm (especially in the lip/chin area). This is normal and will fade in time. It might take 6-12 months for this to happen.     *Massaging the area will help the scar soften and fade quicker. Begin to massage the area one month after the bandages have been removed. To massage apply pressure directly and firmly over the scar with the fingertips and move in a circular motion. Massage the area for a few minutes several times a day. Continue to massage the site for several months.    *Approximately 6-8 weeks after surgery it is not uncommon to see the formation of  tender pimple-like  bump along the scar. This is normal. As the scar continues to mature and the stitches underneath the skin begin to dissolve, this might occur. Do not pick or squeeze, this will resolve on it s own. Should one break open producing a small amount of drainage, apply Polysporin or Bacitracin ointment a few times a day until the wound is completely healed.    *Numbness in the surgical area is expected. It might take 12-18 months for the feeling to return to normal. During this time sensations of itchiness, tingling and occasional sharp pains might be noted. These feelings are normal and will subside once the nerves have completely healed.         If you were seen in [place] call: [phone]

## 2021-01-27 LAB — COPATH REPORT: NORMAL

## 2021-01-28 ENCOUNTER — TELEPHONE (OUTPATIENT)
Dept: DERMATOLOGY | Facility: CLINIC | Age: 71
End: 2021-01-28

## 2021-01-28 NOTE — TELEPHONE ENCOUNTER
----- Message from Kasandra Richards PA-C sent at 1/28/2021  8:56 AM CST -----  Skin, left superior forehead, punch:   - Dilated pore of Altagracia  ( enlarged pore) This is a benign lesion that does not need any additional treatment.

## 2021-01-29 NOTE — TELEPHONE ENCOUNTER
Called and spoke to patient and patients daughter Layla (CTC on file).    Educated Layla on patients biopsy results- dilated pore of Altagracia, benign enlarged pore.    No further tx needed.    Layla voiced understanding.    BELLO Cobos-BSN-N  [facility]  [phone]

## 2021-04-09 ENCOUNTER — ALLIED HEALTH/NURSE VISIT (OUTPATIENT)
Dept: FAMILY MEDICINE | Facility: CLINIC | Age: 71
End: 2021-04-09
Payer: COMMERCIAL

## 2021-04-09 DIAGNOSIS — Z23 NEED FOR SHINGLES VACCINE: Primary | ICD-10-CM

## 2021-04-09 PROBLEM — M79.605 PAIN OF LEFT LOWER EXTREMITY: Status: RESOLVED | Noted: 2020-10-30 | Resolved: 2021-04-09

## 2021-04-09 PROBLEM — M79.604 PAIN OF RIGHT LOWER EXTREMITY: Status: RESOLVED | Noted: 2020-10-30 | Resolved: 2021-04-09

## 2021-04-09 PROCEDURE — 99207 PR NO CHARGE NURSE ONLY: CPT

## 2021-04-09 PROCEDURE — 90750 HZV VACC RECOMBINANT IM: CPT

## 2021-04-09 PROCEDURE — 90471 IMMUNIZATION ADMIN: CPT

## 2021-06-29 DIAGNOSIS — F33.0 MILD EPISODE OF RECURRENT MAJOR DEPRESSIVE DISORDER (H): ICD-10-CM

## 2021-06-29 RX ORDER — BUPROPION HYDROCHLORIDE 150 MG/1
TABLET ORAL
Qty: 30 TABLET | Refills: 0 | Status: SHIPPED | OUTPATIENT
Start: 2021-06-29 | End: 2021-07-23

## 2021-06-29 NOTE — TELEPHONE ENCOUNTER
Routing refill request to provider for review/approval because:  Labs out of range:  PHQ-9  Labs not current:  PHQ-9  Patient needs to be seen because it has been more than 1 year since last office visit.    PHQ 6/18/2018 5/12/2020 10/23/2020   PHQ-9 Total Score 10 6 5   Q9: Thoughts of better off dead/self-harm past 2 weeks Not at all Several days Not at all     Vickey THURMAN RN

## 2021-07-23 DIAGNOSIS — F33.0 MILD EPISODE OF RECURRENT MAJOR DEPRESSIVE DISORDER (H): ICD-10-CM

## 2021-07-23 RX ORDER — BUPROPION HYDROCHLORIDE 150 MG/1
TABLET ORAL
Qty: 30 TABLET | Refills: 0 | Status: SHIPPED | OUTPATIENT
Start: 2021-07-23 | End: 2021-12-17

## 2021-07-23 NOTE — TELEPHONE ENCOUNTER
Routing refill request to provider for review/approval because:  Kae given x1 and patient did not follow up, please advise  Labs out of range:  PHQ-9  Labs not current:  PHQ-9  Patient needs to be seen because it has been more than 1 year since last office visit.    PHQ 6/18/2018 5/12/2020 10/23/2020   PHQ-9 Total Score 10 6 5   Q9: Thoughts of better off dead/self-harm past 2 weeks Not at all Several days Not at all     Vickey THURMAN RN

## 2021-08-13 ENCOUNTER — APPOINTMENT (OUTPATIENT)
Dept: URGENT CARE | Facility: CLINIC | Age: 71
End: 2021-08-13
Payer: COMMERCIAL

## 2021-09-18 ENCOUNTER — HEALTH MAINTENANCE LETTER (OUTPATIENT)
Age: 71
End: 2021-09-18

## 2021-09-27 ENCOUNTER — OFFICE VISIT (OUTPATIENT)
Dept: FAMILY MEDICINE | Facility: CLINIC | Age: 71
End: 2021-09-27
Payer: COMMERCIAL

## 2021-09-27 VITALS
BODY MASS INDEX: 25.33 KG/M2 | DIASTOLIC BLOOD PRESSURE: 72 MMHG | WEIGHT: 152 LBS | HEIGHT: 65 IN | OXYGEN SATURATION: 98 % | RESPIRATION RATE: 16 BRPM | TEMPERATURE: 97.7 F | SYSTOLIC BLOOD PRESSURE: 126 MMHG | HEART RATE: 74 BPM

## 2021-09-27 DIAGNOSIS — Z12.31 ENCOUNTER FOR SCREENING MAMMOGRAM FOR BREAST CANCER: ICD-10-CM

## 2021-09-27 DIAGNOSIS — M79.645 PAIN OF LEFT THUMB: ICD-10-CM

## 2021-09-27 DIAGNOSIS — Z00.00 PREVENTATIVE HEALTH CARE: ICD-10-CM

## 2021-09-27 DIAGNOSIS — E78.00 ELEVATED LDL CHOLESTEROL LEVEL: ICD-10-CM

## 2021-09-27 DIAGNOSIS — Z01.818 PREOP GENERAL PHYSICAL EXAM: ICD-10-CM

## 2021-09-27 DIAGNOSIS — M79.642 PAIN OF LEFT HAND: ICD-10-CM

## 2021-09-27 LAB
ANION GAP SERPL CALCULATED.3IONS-SCNC: 4 MMOL/L (ref 3–14)
BASOPHILS # BLD AUTO: 0 10E3/UL (ref 0–0.2)
BASOPHILS NFR BLD AUTO: 0 %
BUN SERPL-MCNC: 15 MG/DL (ref 7–30)
CALCIUM SERPL-MCNC: 8.6 MG/DL (ref 8.5–10.1)
CHLORIDE BLD-SCNC: 108 MMOL/L (ref 94–109)
CHOLEST SERPL-MCNC: 265 MG/DL
CO2 SERPL-SCNC: 26 MMOL/L (ref 20–32)
CREAT SERPL-MCNC: 0.79 MG/DL (ref 0.52–1.04)
CRP SERPL-MCNC: <2.9 MG/L (ref 0–8)
DEPRECATED CALCIDIOL+CALCIFEROL SERPL-MC: 49 UG/L (ref 20–75)
EOSINOPHIL # BLD AUTO: 0.1 10E3/UL (ref 0–0.7)
EOSINOPHIL NFR BLD AUTO: 2 %
ERYTHROCYTE [DISTWIDTH] IN BLOOD BY AUTOMATED COUNT: 12.8 % (ref 10–15)
ERYTHROCYTE [SEDIMENTATION RATE] IN BLOOD BY WESTERGREN METHOD: 5 MM/HR (ref 0–30)
FASTING STATUS PATIENT QL REPORTED: YES
GFR SERPL CREATININE-BSD FRML MDRD: 76 ML/MIN/1.73M2
GLUCOSE BLD-MCNC: 98 MG/DL (ref 70–99)
HCT VFR BLD AUTO: 42 % (ref 35–47)
HDLC SERPL-MCNC: 62 MG/DL
HGB BLD-MCNC: 14.3 G/DL (ref 11.7–15.7)
LDLC SERPL CALC-MCNC: 177 MG/DL
LYMPHOCYTES # BLD AUTO: 1.4 10E3/UL (ref 0.8–5.3)
LYMPHOCYTES NFR BLD AUTO: 24 %
MCH RBC QN AUTO: 30 PG (ref 26.5–33)
MCHC RBC AUTO-ENTMCNC: 34 G/DL (ref 31.5–36.5)
MCV RBC AUTO: 88 FL (ref 78–100)
MONOCYTES # BLD AUTO: 0.6 10E3/UL (ref 0–1.3)
MONOCYTES NFR BLD AUTO: 10 %
NEUTROPHILS # BLD AUTO: 3.7 10E3/UL (ref 1.6–8.3)
NEUTROPHILS NFR BLD AUTO: 64 %
NONHDLC SERPL-MCNC: 203 MG/DL
PLATELET # BLD AUTO: 247 10E3/UL (ref 150–450)
POTASSIUM BLD-SCNC: 4.3 MMOL/L (ref 3.4–5.3)
RBC # BLD AUTO: 4.76 10E6/UL (ref 3.8–5.2)
RHEUMATOID FACT SER NEPH-ACNC: <7 IU/ML
SODIUM SERPL-SCNC: 138 MMOL/L (ref 133–144)
TRIGL SERPL-MCNC: 130 MG/DL
WBC # BLD AUTO: 5.8 10E3/UL (ref 4–11)

## 2021-09-27 PROCEDURE — 99214 OFFICE O/P EST MOD 30 MIN: CPT | Mod: 25 | Performed by: FAMILY MEDICINE

## 2021-09-27 PROCEDURE — 85025 COMPLETE CBC W/AUTO DIFF WBC: CPT | Performed by: FAMILY MEDICINE

## 2021-09-27 PROCEDURE — 80061 LIPID PANEL: CPT | Performed by: FAMILY MEDICINE

## 2021-09-27 PROCEDURE — 82306 VITAMIN D 25 HYDROXY: CPT | Performed by: FAMILY MEDICINE

## 2021-09-27 PROCEDURE — 90471 IMMUNIZATION ADMIN: CPT | Performed by: FAMILY MEDICINE

## 2021-09-27 PROCEDURE — 80048 BASIC METABOLIC PNL TOTAL CA: CPT | Performed by: FAMILY MEDICINE

## 2021-09-27 PROCEDURE — 86431 RHEUMATOID FACTOR QUANT: CPT | Performed by: FAMILY MEDICINE

## 2021-09-27 PROCEDURE — 86200 CCP ANTIBODY: CPT | Performed by: FAMILY MEDICINE

## 2021-09-27 PROCEDURE — 36415 COLL VENOUS BLD VENIPUNCTURE: CPT | Performed by: FAMILY MEDICINE

## 2021-09-27 PROCEDURE — 86140 C-REACTIVE PROTEIN: CPT | Performed by: FAMILY MEDICINE

## 2021-09-27 PROCEDURE — 90662 IIV NO PRSV INCREASED AG IM: CPT | Performed by: FAMILY MEDICINE

## 2021-09-27 PROCEDURE — 86038 ANTINUCLEAR ANTIBODIES: CPT | Performed by: FAMILY MEDICINE

## 2021-09-27 PROCEDURE — 85652 RBC SED RATE AUTOMATED: CPT | Performed by: FAMILY MEDICINE

## 2021-09-27 ASSESSMENT — ENCOUNTER SYMPTOMS
WEAKNESS: 0
PARESTHESIAS: 0
COUGH: 1
NERVOUS/ANXIOUS: 0
DIARRHEA: 0
NAUSEA: 0
EYE PAIN: 0
CONSTIPATION: 0
ABDOMINAL PAIN: 0
FEVER: 0
ARTHRALGIAS: 1
SHORTNESS OF BREATH: 0
JOINT SWELLING: 1
MYALGIAS: 0
DIZZINESS: 0
HEMATURIA: 0
HEMATOCHEZIA: 0
SORE THROAT: 0
HEADACHES: 0
DYSURIA: 0
PALPITATIONS: 0
HEARTBURN: 0
FREQUENCY: 1
CHILLS: 0
BREAST MASS: 0

## 2021-09-27 ASSESSMENT — MIFFLIN-ST. JEOR: SCORE: 1197.41

## 2021-09-27 ASSESSMENT — ACTIVITIES OF DAILY LIVING (ADL): CURRENT_FUNCTION: NO ASSISTANCE NEEDED

## 2021-09-27 NOTE — PROGRESS NOTES
[facility]  [id number] [facility] [address]  Phone: [phone]  Primary Provider: [facility]  Pre-op Performing Provider: DINAH RODRIGUEZ      PREOPERATIVE EVALUATION:  Today's date: 9/27/2021    Amelia Ozuna is a 70 year old female who presents for a preoperative evaluation.    Surgical Information:  Surgery/Procedure: Left hand surgery--Tendon release  Surgery Location: [facility]  Surgeon: Dr. Micheline Rivera M.D.  Surgery Date: 10/06/2021  Time of Surgery: 11:00 am  Where patient plans to recover: At home with family  Fax number for surgical facility: [phone]    Type of Anesthesia Anticipated: General    Assessment & Plan     The proposed surgical procedure is considered INTERMEDIATE risk.  See after visit summary and result note from studies for helpful information and advice given to patient.    Preop general physical exam  Patient cleared for upcoming surgery.  - Basic metabolic panel    Pain of left hand  Patient cleared for upcoming surgery.  - Anti Nuclear Jojo IgG by IFA with Reflex  - Cyclic Citrullinated Peptide Antibody IgG  - CBC with platelets and differential  - CRP, inflammation  - ESR: Erythrocyte sedimentation rate  - Rheumatoid factor    Pain of left thumb  Patient cleared for upcoming surgery.    Preventative health care    - INFLUENZA, QUAD, HIGH DOSE, PF, 65YR + (FLUZONE HD)  - Vitamin D Deficiency  - Lipid panel reflex to direct LDL Fasting    Encounter for screening mammogram for breast cancer    - MA SCREENING DIGITAL BILAT - Future  (s+30)    Elevated LDL cholesterol level  Patient has an excellent HDL cholesterol level.  No new medication treatment recommended.                 RECOMMENDATION:  APPROVAL GIVEN to proceed with proposed procedure, without further diagnostic evaluation.                      Subjective     HPI related to upcoming procedure: Patient has had left hand pain for years, and will have a  tendon release procedure for treatment.      Preop Questions 9/27/2021   1. Have you ever had a heart attack or stroke? No   2. Have you ever had surgery on your heart or blood vessels, such as a stent placement, a coronary artery bypass, or surgery on an artery in your head, neck, heart, or legs? No   3. Do you have chest pain with activity? No   4. Do you have a history of  heart failure? No   5. Do you currently have a cold, bronchitis or symptoms of other infection? No   6. Do you have a cough, shortness of breath, or wheezing? No   7. Do you or anyone in your family have previous history of blood clots? No   8. Do you or does anyone in your family have a serious bleeding problem such as prolonged bleeding following surgeries or cuts? No   9. Have you ever had problems with anemia or been told to take iron pills? No   10. Have you had any abnormal blood loss such as black, tarry or bloody stools, or abnormal vaginal bleeding? No   11. Have you ever had a blood transfusion? No   12. Are you willing to have a blood transfusion if it is medically needed before, during, or after your surgery? Yes   13. Have you or any of your relatives ever had problems with anesthesia? No   14. Do you have sleep apnea, excessive snoring or daytime drowsiness? No   15. Do you have any artifical heart valves or other implanted medical devices like a pacemaker, defibrillator, or continuous glucose monitor? No   16. Do you have artificial joints? No   17. Are you allergic to latex? No     Health Care Directive:  Patient does not have a Health Care Directive or Living Will: Discussed advance care planning with patient; information given to patient to review.    Preoperative Review of :   reviewed - no record of controlled substances prescribed.          Review of Systems  Constitutional, neuro, ENT, endocrine, pulmonary, cardiac, gastrointestinal, genitourinary, musculoskeletal, integument and psychiatric systems are negative,  except as otherwise noted.    Patient has no acute physical or mental health concerns other than her chronic left hand discomfort.    Patient has pain in left thumb and distal metacarpals, especially when she makes a fist.    Patient is agreeable to my suggestion of doing some preventive health studies to include checking her lipids, and ordering a mammogram this visit.    Patient Active Problem List    Diagnosis Date Noted     Seborrheic keratoses 06/01/2018     Priority: Medium     BPPV (benign paroxysmal positional vertigo) 08/21/2014     Priority: Medium     Advanced directives, counseling/discussion 11/29/2011     Priority: Medium     Advance Directive Problem List Overview:   Name Relationship Phone    Primary Health Care Agent            Alternative Health Care Agent          Discussed advance care planning with patient; information given to patient to review. 11/29/2011          HYPERLIPIDEMIA LDL GOAL <130 10/31/2010     Priority: Medium     Hypercholesterolemia 10/24/2008     Priority: Medium     Sjs per [place]        Past Medical History:   Diagnosis Date     Actinic keratosis      Leiomyoma of uterus, unspecified 10/2000     Past Surgical History:   Procedure Laterality Date     C LIGATE FALLOPIAN TUBE,POSTPARTUM  1981    Tubal Ligation     Z NONSPECIFIC PROCEDURE      Tonsillectomy     Z NONSPECIFIC PROCEDURE      Oophorectomy benign serous cystadenoma (L)     Current Outpatient Medications   Medication Sig Dispense Refill     buPROPion (WELLBUTRIN XL) 150 MG 24 hr tablet TAKE 1 TABLET BY MOUTH EVERY DAY IN THE MORNING 30 tablet 0     Cholecalciferol (VITAMIN D PO)        MAGNESIUM PO        Multiple Vitamins-Minerals (ZINC PO)        SESAME OIL Once in a day         Allergies   Allergen Reactions     No Known Drug Allergies         Social History     Tobacco Use     Smoking status: Never Smoker     Smokeless tobacco: Never Used   Substance Use Topics     Alcohol use: No       History   Drug Use No       "   Objective     /72 (Cuff Size: Adult Regular)   Pulse 74   Temp 97.7  F (36.5  C)   Resp 16   Ht 1.638 m (5' 4.5\")   Wt 68.9 kg (152 lb)   SpO2 98%   BMI 25.69 kg/m      Physical Exam  General: Vital signs reviewed.  Patient is in no acute appearing distress.  Breathing appears nonlabored.  Patient is alert and oriented ×3.      ENT: Ear exam shows bilateral tympanic membranes to be clear without injection, nasal turbinates show no injection or edema, no pharyngeal injection or exudate.    Neck: supple with no adenoapthy, palpable abnormal masses, or thyroid abnormality.    Eyes: No scleral, lid, or periorbital injection or edema noted.  No eye mattering noted.  Corneas are clear. Pupils are equal round and reactive to light with normal consensual eye movement.    Heart: Heart rate is regular without murmur.    Lungs: Lungs are clear to auscultation with good airflow bilaterally.    Abdomen:  Abdomen is soft, nontender.  No palpable abnormal masses or organomegaly.  Bowel sounds are normal.    Back: No areas of tenderness.    Skin: Warm and dry, with no rash or abnormal lesions noted.    Extremities: No ankle edema noted. Patient has tender mild swelling in distal left metacarpals without associated skin discoloration. She has difficulty making a tight fist with left hand fingers.     Neuro: No acute focal deficits  Or other abnormalities noted.    Psych: Patient is very pleasant, making good eye contact, with clear and fluent speech.  Answers questions appropriately. No psychomotor agitation.          Recent Labs   Lab Test 10/23/20  1615 06/24/20  1025   HGB 14.3 14.3    230    140   POTASSIUM 4.4 4.5   CR 0.64 0.73        Diagnostics:  Recent Results (from the past 168 hour(s))   Anti Nuclear Jojo IgG by IFA with Reflex    Collection Time: 09/27/21  8:27 AM   Result Value Ref Range    AICHA interpretation Negative Negative   CRP, inflammation    Collection Time: 09/27/21  8:27 AM   Result " Value Ref Range    CRP Inflammation <2.9 0.0 - 8.0 mg/L   ESR: Erythrocyte sedimentation rate    Collection Time: 09/27/21  8:27 AM   Result Value Ref Range    Erythrocyte Sedimentation Rate 5 0 - 30 mm/hr   Rheumatoid factor    Collection Time: 09/27/21  8:27 AM   Result Value Ref Range    Rheumatoid Factor <7 <20 IU/mL   Vitamin D Deficiency    Collection Time: 09/27/21  8:27 AM   Result Value Ref Range    Vitamin D, Total (25-Hydroxy) 49 20 - 75 ug/L   Basic metabolic panel    Collection Time: 09/27/21  8:27 AM   Result Value Ref Range    Sodium 138 133 - 144 mmol/L    Potassium 4.3 3.4 - 5.3 mmol/L    Chloride 108 94 - 109 mmol/L    Carbon Dioxide (CO2) 26 20 - 32 mmol/L    Anion Gap 4 3 - 14 mmol/L    Urea Nitrogen 15 7 - 30 mg/dL    Creatinine 0.79 0.52 - 1.04 mg/dL    Calcium 8.6 8.5 - 10.1 mg/dL    Glucose 98 70 - 99 mg/dL    GFR Estimate 76 >60 mL/min/1.73m2   Lipid panel reflex to direct LDL Fasting    Collection Time: 09/27/21  8:27 AM   Result Value Ref Range    Cholesterol 265 (H) <200 mg/dL    Triglycerides 130 <150 mg/dL    Direct Measure HDL 62 >=50 mg/dL    LDL Cholesterol Calculated 177 (H) <=100 mg/dL    Non HDL Cholesterol 203 (H) <130 mg/dL    Patient Fasting > 8hrs? Yes    CBC with platelets and differential    Collection Time: 09/27/21  8:27 AM   Result Value Ref Range    WBC Count 5.8 4.0 - 11.0 10e3/uL    RBC Count 4.76 3.80 - 5.20 10e6/uL    Hemoglobin 14.3 11.7 - 15.7 g/dL    Hematocrit 42.0 35.0 - 47.0 %    MCV 88 78 - 100 fL    MCH 30.0 26.5 - 33.0 pg    MCHC 34.0 31.5 - 36.5 g/dL    RDW 12.8 10.0 - 15.0 %    Platelet Count 247 150 - 450 10e3/uL    % Neutrophils 64 %    % Lymphocytes 24 %    % Monocytes 10 %    % Eosinophils 2 %    % Basophils 0 %    Absolute Neutrophils 3.7 1.6 - 8.3 10e3/uL    Absolute Lymphocytes 1.4 0.8 - 5.3 10e3/uL    Absolute Monocytes 0.6 0.0 - 1.3 10e3/uL    Absolute Eosinophils 0.1 0.0 - 0.7 10e3/uL    Absolute Basophils 0.0 0.0 - 0.2 10e3/uL      Lab results  "not available for review at time of exam.  Please see result note. No lab results noted which will delay upcoming surgery.     No EKG required, no history of coronary heart disease, significant arrhythmia, peripheral arterial disease or other structural heart disease.    Revised Cardiac Risk Index (RCRI):  The patient has the following serious cardiovascular risks for perioperative complications:   - No serious cardiac risks = 0 points     RCRI Interpretation: 0 points: Class I (very low risk - 0.4% complication rate)           Signed Electronically by: Dave Bahena DO  Copy of this evaluation report is provided to requesting physician.    Answers for HPI/ROS submitted by the patient on 9/27/2021  In general, how would you rate your overall physical health?: good  Frequency of exercise:: 2-3 days/week  Do you usually eat at least 4 servings of fruit and vegetables a day, include whole grains & fiber, and avoid regularly eating high fat or \"junk\" foods? : Yes  Taking medications regularly:: No  Medication side effects:: None  Activities of Daily Living: no assistance needed  Home safety: no safety concerns identified  Hearing Impairment:: no hearing concerns  In the past 6 months, have you been bothered by leaking of urine?: Yes  Blood in stool: No  heartburn: No  peripheral edema: No  mood changes: No  Skin sensation changes: No  tenderness: No  breast mass: No  breast discharge: No  In general, how would you rate your overall mental or emotional health?: good  Additional concerns today:: Yes  Duration of exercise:: Less than 15 minutes      "

## 2021-09-27 NOTE — PATIENT INSTRUCTIONS
I will review your studies via [employer].     Patient Education   Personalized Prevention Plan  You are due for the preventive services outlined below.  Your care team is available to assist you in scheduling these services.  If you have already completed any of these items, please share that information with your care team to update in your medical record.  Health Maintenance Due   Topic Date Due     Depression Assessment  2021     Flu Vaccine (1) 2021     Mammogram  10/05/2021     Annual Wellness Visit  10/23/2021     ANNUAL REVIEW OF HM ORDERS  10/23/2021     FALL RISK ASSESSMENT  10/23/2021        Preparing for Your Surgery  Getting started  A nurse will call you to review your health history and instructions. They will give you an arrival time based on your scheduled surgery time.  Please be ready to share the following:    Your doctor's clinic name and phone number    Your medical, surgical and anesthesia history    A list of allergies and sensitivities    A list of medicines, including herbal treatments and over-the-counter drugs    Whether the patient has a legal guardian (ask how to send us the papers in advance)  If you have a child who's having surgery, please ask for a copy of Preparing for Your Child's Surgery.    Preparing for surgery    Within 30 days of surgery: Have a pre-op exam (sometimes called an H&P, or History and Physical). This can be done at a clinic or pre-operative center.  ? If you're having a , you may not need this exam. Talk to your care team    At your pre-op exam, talk to your care team about all medicines you take. If you need to stop any medicines before surgery, ask when to start taking them again.  ? We do this for your safety. Many medicines can make you bleed too much during surgery. Some change how well surgery (anesthesia) drugs work.    Call your insurance company to let them know you're having surgery. (If you don't have insurance, call  [phone].)    Call your clinic if there's any change in your health. This includes signs of a cold or flu (sore throat, runny nose, cough, rash, fever). It also includes a scrape or scratch near the surgery site.    If you have questions on the day of surgery, call your hospital or surgery center.  Eating and drinking guidelines  For your safety: Unless your surgeon tells you otherwise, follow the guidelines below.    Eat and drink as usual until 8 hours before surgery. After that, no food or milk.    Drink clear liquids until 2 hours before surgery. These are liquids you can see through, like water, Gatorade and Propel Water. You may also have black coffee and tea (no cream or milk).    Nothing by mouth within 2 hours of surgery. This includes gum, candy and breath mints.    If you drink, stop drinking alcohol the night before surgery.    If your care team tells you to take medicine on the morning of surgery, it's okay to take it with a sip of water.  Preventing infection    Shower or bathe the night before and morning of your surgery. Follow the instructions your clinic gave you. (If no instructions, use regular soap.)    Don't shave or clip hair near your surgery site. We'll remove the hair if needed.    Don't smoke or vape the morning of surgery. You may chew nicotine gum up to 2 hours before surgery. A nicotine patch is okay.  ? Note: Some surgeries require you to completely quit smoking and nicotine. Check with your surgeon.    Your care team will make every effort to keep you safe from infection. We will:  ? Clean our hands often with soap and water (or an alcohol-based hand rub).  ? Clean the skin at your surgery site with a special soap that kills germs.  ? Give you a special gown to keep you warm. (Cold raises the risk of infection.)  ? Wear special hair covers, masks, gowns and gloves during surgery.  ? Give antibiotic medicine, if prescribed. Not all surgeries need antibiotics.  What to bring on the  day of surgery    Photo ID and insurance card    Copy of your health care directive, if you have one    Glasses and hearing aides (bring cases)  ? You can't wear contacts during surgery    Inhaler and eye drops, if you use them (tell us about these when you arrive)    CPAP machine or breathing device, if you use them    A few personal items, if spending the night    If you have . . .  ? A pacemaker or ICD (cardiac defibrillator): Bring the ID card.  ? An implanted stimulator: Bring the remote control.  ? A legal guardian: Bring a copy of the certified (court-stamped) guardianship papers.  Please remove any jewelry, including body piercings. Leave jewelry and other valuables at home.  If you're going home the day of surgery  Important: If you don't follow the rules below, we must cancel your surgery.     Arrange for someone to drive you home after surgery. You may not drive, take a taxi or take public transportation by yourself (unless you'll have local anesthesia only).    Arrange for a responsible adult to stay with you overnight. If you don't, we may keep you in the hospital overnight, and you may need to pay the costs yourself.  Questions?   If you have any questions for your care team, list them here: _________________________________________________________________________________________________________________________________________________________________________________________________________________________________________________________________________________________________________________________  For informational purposes only. Not to replace the advice of your health care provider. Copyright   2003, 2019 [facility]. All rights reserved. Clinically reviewed by Sujatha Morel MD. [employer] [id number] - REV 4/20.

## 2021-09-28 LAB — ANA SER QL IF: NEGATIVE

## 2021-09-29 LAB — CCP AB SER IA-ACNC: 1 U/ML

## 2021-11-09 ENCOUNTER — HOSPITAL ENCOUNTER (OUTPATIENT)
Dept: MAMMOGRAPHY | Facility: CLINIC | Age: 71
Discharge: HOME | End: 2021-11-09
Attending: FAMILY MEDICINE | Admitting: FAMILY MEDICINE
Payer: COMMERCIAL

## 2021-11-09 DIAGNOSIS — Z12.31 ENCOUNTER FOR SCREENING MAMMOGRAM FOR BREAST CANCER: ICD-10-CM

## 2021-11-09 PROCEDURE — 77067 SCR MAMMO BI INCL CAD: CPT

## 2021-12-15 DIAGNOSIS — F33.0 MILD EPISODE OF RECURRENT MAJOR DEPRESSIVE DISORDER (H): ICD-10-CM

## 2021-12-17 RX ORDER — BUPROPION HYDROCHLORIDE 150 MG/1
TABLET ORAL
Qty: 30 TABLET | Refills: 0 | Status: SHIPPED | OUTPATIENT
Start: 2021-12-17 | End: 2022-01-05

## 2021-12-17 NOTE — TELEPHONE ENCOUNTER
Routing refill request to provider for review/approval because:  Labs not current:    PHQ-9 score:    PHQ 10/23/2020   PHQ-9 Total Score 5   Q9: Thoughts of better off dead/self-harm past 2 weeks Not at all             FRANCISCO CaoN, RN  [facility]

## 2022-01-05 DIAGNOSIS — F33.0 MILD EPISODE OF RECURRENT MAJOR DEPRESSIVE DISORDER (H): ICD-10-CM

## 2022-01-07 NOTE — TELEPHONE ENCOUNTER
Routing refill request to provider for review/approval because:  Labs out of range:  PHQ-9    PHQ 6/18/2018 5/12/2020 10/23/2020   PHQ-9 Total Score 10 6 5   Q9: Thoughts of better off dead/self-harm past 2 weeks Not at all Several days Not at all     Vickey THURMAN RN

## 2022-01-11 RX ORDER — BUPROPION HYDROCHLORIDE 150 MG/1
TABLET ORAL
Qty: 30 TABLET | Refills: 0 | Status: SHIPPED | OUTPATIENT
Start: 2022-01-11 | End: 2022-04-11

## 2022-04-08 DIAGNOSIS — F33.0 MILD EPISODE OF RECURRENT MAJOR DEPRESSIVE DISORDER (H): ICD-10-CM

## 2022-04-11 RX ORDER — BUPROPION HYDROCHLORIDE 150 MG/1
TABLET ORAL
Qty: 90 TABLET | Refills: 1 | Status: SHIPPED | OUTPATIENT
Start: 2022-04-11 | End: 2022-06-01

## 2022-04-11 NOTE — TELEPHONE ENCOUNTER
Routing refill request to provider for review/approval because:  A break in medication Last filled 1/2022 # 30   PHQ-9 score:    PHQ 10/23/2020   PHQ-9 Total Score 5   Q9: Thoughts of better off dead/self-harm past 2 weeks Not at all       Team please call to schedule medication follow up with provider    Annalisa Vaca RN

## 2022-04-19 NOTE — TELEPHONE ENCOUNTER
Per patient request scheduled annual exam for 6/1/22 after she will be back in town. She would like to discuss this medication.  Reyna Taylor,

## 2022-05-10 ENCOUNTER — TELEPHONE (OUTPATIENT)
Dept: FAMILY MEDICINE | Facility: CLINIC | Age: 72
End: 2022-05-10
Payer: COMMERCIAL

## 2022-05-10 NOTE — TELEPHONE ENCOUNTER
Patient Quality Outreach      Summary:    Patient has the following on her problem list/HM: None    Patient is due/failing the following:   Physical  - now   Pt scheduled for 06/01/2022  Concepcion Tamez CMA  [facility]

## 2022-06-01 ENCOUNTER — OFFICE VISIT (OUTPATIENT)
Dept: FAMILY MEDICINE | Facility: CLINIC | Age: 72
End: 2022-06-01
Payer: COMMERCIAL

## 2022-06-01 VITALS
WEIGHT: 155.8 LBS | RESPIRATION RATE: 16 BRPM | HEIGHT: 65 IN | TEMPERATURE: 98.1 F | DIASTOLIC BLOOD PRESSURE: 72 MMHG | BODY MASS INDEX: 25.96 KG/M2 | SYSTOLIC BLOOD PRESSURE: 124 MMHG | OXYGEN SATURATION: 98 % | HEART RATE: 78 BPM

## 2022-06-01 DIAGNOSIS — Z13.220 SCREENING, LIPID: ICD-10-CM

## 2022-06-01 DIAGNOSIS — Z13.1 SCREENING FOR DIABETES MELLITUS: ICD-10-CM

## 2022-06-01 DIAGNOSIS — B35.1 FUNGAL NAIL INFECTION: ICD-10-CM

## 2022-06-01 DIAGNOSIS — F33.0 MILD EPISODE OF RECURRENT MAJOR DEPRESSIVE DISORDER (H): ICD-10-CM

## 2022-06-01 DIAGNOSIS — Z00.00 ENCOUNTER FOR MEDICARE ANNUAL WELLNESS EXAM: Primary | ICD-10-CM

## 2022-06-01 DIAGNOSIS — Z13.0 SCREENING FOR BLOOD DISEASE: ICD-10-CM

## 2022-06-01 LAB
ALBUMIN SERPL-MCNC: 4 G/DL (ref 3.4–5)
ALP SERPL-CCNC: 84 U/L (ref 40–150)
ALT SERPL W P-5'-P-CCNC: 32 U/L (ref 0–50)
ANION GAP SERPL CALCULATED.3IONS-SCNC: 7 MMOL/L (ref 3–14)
AST SERPL W P-5'-P-CCNC: 25 U/L (ref 0–45)
BILIRUB SERPL-MCNC: 0.5 MG/DL (ref 0.2–1.3)
BUN SERPL-MCNC: 16 MG/DL (ref 7–30)
CALCIUM SERPL-MCNC: 9.6 MG/DL (ref 8.5–10.1)
CHLORIDE BLD-SCNC: 107 MMOL/L (ref 94–109)
CHOLEST SERPL-MCNC: 253 MG/DL
CO2 SERPL-SCNC: 26 MMOL/L (ref 20–32)
CREAT SERPL-MCNC: 0.72 MG/DL (ref 0.52–1.04)
ERYTHROCYTE [DISTWIDTH] IN BLOOD BY AUTOMATED COUNT: 12.8 % (ref 10–15)
FASTING STATUS PATIENT QL REPORTED: YES
GFR SERPL CREATININE-BSD FRML MDRD: 88 ML/MIN/1.73M2
GLUCOSE BLD-MCNC: 83 MG/DL (ref 70–99)
HCT VFR BLD AUTO: 40.5 % (ref 35–47)
HDLC SERPL-MCNC: 64 MG/DL
HGB BLD-MCNC: 14 G/DL (ref 11.7–15.7)
LDLC SERPL CALC-MCNC: 168 MG/DL
MCH RBC QN AUTO: 30.1 PG (ref 26.5–33)
MCHC RBC AUTO-ENTMCNC: 34.6 G/DL (ref 31.5–36.5)
MCV RBC AUTO: 87 FL (ref 78–100)
NONHDLC SERPL-MCNC: 189 MG/DL
PLATELET # BLD AUTO: 232 10E3/UL (ref 150–450)
POTASSIUM BLD-SCNC: 4.1 MMOL/L (ref 3.4–5.3)
PROT SERPL-MCNC: 7.2 G/DL (ref 6.8–8.8)
RBC # BLD AUTO: 4.65 10E6/UL (ref 3.8–5.2)
SODIUM SERPL-SCNC: 140 MMOL/L (ref 133–144)
TRIGL SERPL-MCNC: 106 MG/DL
WBC # BLD AUTO: 4.5 10E3/UL (ref 4–11)

## 2022-06-01 PROCEDURE — 99397 PER PM REEVAL EST PAT 65+ YR: CPT | Mod: 25 | Performed by: PHYSICIAN ASSISTANT

## 2022-06-01 PROCEDURE — 80053 COMPREHEN METABOLIC PANEL: CPT | Performed by: PHYSICIAN ASSISTANT

## 2022-06-01 PROCEDURE — 99213 OFFICE O/P EST LOW 20 MIN: CPT | Mod: 25 | Performed by: PHYSICIAN ASSISTANT

## 2022-06-01 PROCEDURE — 85027 COMPLETE CBC AUTOMATED: CPT | Performed by: PHYSICIAN ASSISTANT

## 2022-06-01 PROCEDURE — 36415 COLL VENOUS BLD VENIPUNCTURE: CPT | Performed by: PHYSICIAN ASSISTANT

## 2022-06-01 PROCEDURE — 80061 LIPID PANEL: CPT | Performed by: PHYSICIAN ASSISTANT

## 2022-06-01 PROCEDURE — 90715 TDAP VACCINE 7 YRS/> IM: CPT | Performed by: PHYSICIAN ASSISTANT

## 2022-06-01 PROCEDURE — 90471 IMMUNIZATION ADMIN: CPT | Performed by: PHYSICIAN ASSISTANT

## 2022-06-01 RX ORDER — BUPROPION HYDROCHLORIDE 150 MG/1
TABLET ORAL
Qty: 90 TABLET | Refills: 3 | Status: SHIPPED | OUTPATIENT
Start: 2022-06-01 | End: 2022-09-08

## 2022-06-01 RX ORDER — TERBINAFINE HYDROCHLORIDE 250 MG/1
250 TABLET ORAL DAILY
Qty: 42 TABLET | Refills: 0 | Status: SHIPPED | OUTPATIENT
Start: 2022-06-01 | End: 2022-07-13

## 2022-06-01 SDOH — ECONOMIC STABILITY: FOOD INSECURITY: WITHIN THE PAST 12 MONTHS, THE FOOD YOU BOUGHT JUST DIDN'T LAST AND YOU DIDN'T HAVE MONEY TO GET MORE.: SOMETIMES TRUE

## 2022-06-01 SDOH — HEALTH STABILITY: PHYSICAL HEALTH: ON AVERAGE, HOW MANY DAYS PER WEEK DO YOU ENGAGE IN MODERATE TO STRENUOUS EXERCISE (LIKE A BRISK WALK)?: 1 DAY

## 2022-06-01 SDOH — ECONOMIC STABILITY: INCOME INSECURITY: IN THE LAST 12 MONTHS, WAS THERE A TIME WHEN YOU WERE NOT ABLE TO PAY THE MORTGAGE OR RENT ON TIME?: NO

## 2022-06-01 SDOH — ECONOMIC STABILITY: FOOD INSECURITY: WITHIN THE PAST 12 MONTHS, YOU WORRIED THAT YOUR FOOD WOULD RUN OUT BEFORE YOU GOT MONEY TO BUY MORE.: SOMETIMES TRUE

## 2022-06-01 SDOH — HEALTH STABILITY: PHYSICAL HEALTH: ON AVERAGE, HOW MANY MINUTES DO YOU ENGAGE IN EXERCISE AT THIS LEVEL?: 30 MIN

## 2022-06-01 SDOH — ECONOMIC STABILITY: TRANSPORTATION INSECURITY
IN THE PAST 12 MONTHS, HAS THE LACK OF TRANSPORTATION KEPT YOU FROM MEDICAL APPOINTMENTS OR FROM GETTING MEDICATIONS?: PATIENT DECLINED

## 2022-06-01 SDOH — ECONOMIC STABILITY: INCOME INSECURITY: HOW HARD IS IT FOR YOU TO PAY FOR THE VERY BASICS LIKE FOOD, HOUSING, MEDICAL CARE, AND HEATING?: NOT VERY HARD

## 2022-06-01 SDOH — ECONOMIC STABILITY: TRANSPORTATION INSECURITY
IN THE PAST 12 MONTHS, HAS LACK OF TRANSPORTATION KEPT YOU FROM MEETINGS, WORK, OR FROM GETTING THINGS NEEDED FOR DAILY LIVING?: NO

## 2022-06-01 ASSESSMENT — LIFESTYLE VARIABLES
HOW OFTEN DO YOU HAVE A DRINK CONTAINING ALCOHOL: MONTHLY OR LESS
AUDIT-C TOTAL SCORE: 1
HOW MANY STANDARD DRINKS CONTAINING ALCOHOL DO YOU HAVE ON A TYPICAL DAY: 1 OR 2
SKIP TO QUESTIONS 9-10: 1
HOW OFTEN DO YOU HAVE SIX OR MORE DRINKS ON ONE OCCASION: NEVER

## 2022-06-01 ASSESSMENT — ACTIVITIES OF DAILY LIVING (ADL)
CURRENT_FUNCTION: TELEPHONE REQUIRES ASSISTANCE
CURRENT_FUNCTION: SHOPPING REQUIRES ASSISTANCE
CURRENT_FUNCTION: NO ASSISTANCE NEEDED
CURRENT_FUNCTION: LAUNDRY REQUIRES ASSISTANCE
CURRENT_FUNCTION: HOUSEWORK REQUIRES ASSISTANCE

## 2022-06-01 ASSESSMENT — ENCOUNTER SYMPTOMS
HEMATOCHEZIA: 0
DIZZINESS: 0
WEAKNESS: 0
FREQUENCY: 0
FEVER: 0
CHILLS: 0
MYALGIAS: 0
BREAST MASS: 0
SHORTNESS OF BREATH: 0
CONSTIPATION: 0
NERVOUS/ANXIOUS: 0
COUGH: 0
ARTHRALGIAS: 1
HEMATURIA: 0
HEARTBURN: 0
JOINT SWELLING: 1
DIARRHEA: 0
PALPITATIONS: 0
PARESTHESIAS: 0
NAUSEA: 0
EYE PAIN: 0
ABDOMINAL PAIN: 0
SORE THROAT: 0
DYSURIA: 0
HEADACHES: 0

## 2022-06-01 ASSESSMENT — ANXIETY QUESTIONNAIRES
8. IF YOU CHECKED OFF ANY PROBLEMS, HOW DIFFICULT HAVE THESE MADE IT FOR YOU TO DO YOUR WORK, TAKE CARE OF THINGS AT HOME, OR GET ALONG WITH OTHER PEOPLE?: NOT DIFFICULT AT ALL
6. BECOMING EASILY ANNOYED OR IRRITABLE: NOT AT ALL
4. TROUBLE RELAXING: NOT AT ALL
GAD7 TOTAL SCORE: 0
5. BEING SO RESTLESS THAT IT IS HARD TO SIT STILL: NOT AT ALL
GAD7 TOTAL SCORE: 0
3. WORRYING TOO MUCH ABOUT DIFFERENT THINGS: NOT AT ALL
1. FEELING NERVOUS, ANXIOUS, OR ON EDGE: NOT AT ALL
7. FEELING AFRAID AS IF SOMETHING AWFUL MIGHT HAPPEN: NOT AT ALL
GAD7 TOTAL SCORE: 0
2. NOT BEING ABLE TO STOP OR CONTROL WORRYING: NOT AT ALL
7. FEELING AFRAID AS IF SOMETHING AWFUL MIGHT HAPPEN: NOT AT ALL

## 2022-06-01 ASSESSMENT — SOCIAL DETERMINANTS OF HEALTH (SDOH)
HOW OFTEN DO YOU GET TOGETHER WITH FRIENDS OR RELATIVES?: ONCE A WEEK
HOW OFTEN DO YOU ATTEND CHURCH OR RELIGIOUS SERVICES?: MORE THAN 4 TIMES PER YEAR
DO YOU BELONG TO ANY CLUBS OR ORGANIZATIONS SUCH AS CHURCH GROUPS UNIONS, FRATERNAL OR ATHLETIC GROUPS, OR SCHOOL GROUPS?: YES
IN A TYPICAL WEEK, HOW MANY TIMES DO YOU TALK ON THE PHONE WITH FAMILY, FRIENDS, OR NEIGHBORS?: ONCE A WEEK

## 2022-06-01 ASSESSMENT — PATIENT HEALTH QUESTIONNAIRE - PHQ9
SUM OF ALL RESPONSES TO PHQ QUESTIONS 1-9: 0
SUM OF ALL RESPONSES TO PHQ QUESTIONS 1-9: 0
10. IF YOU CHECKED OFF ANY PROBLEMS, HOW DIFFICULT HAVE THESE PROBLEMS MADE IT FOR YOU TO DO YOUR WORK, TAKE CARE OF THINGS AT HOME, OR GET ALONG WITH OTHER PEOPLE: NOT DIFFICULT AT ALL

## 2022-06-01 NOTE — PROGRESS NOTES
"SUBJECTIVE:   Amelia Ozuna is a 71 year old female who presents for Preventive Visit.    Patient has been advised of split billing requirements and indicates understanding: Yes  Are you in the first 12 months of your Medicare coverage?  No    Healthy Habits:     In general, how would you rate your overall health?  Good    Frequency of exercise:  2-3 days/week    Duration of exercise:  15-30 minutes    Do you usually eat at least 4 servings of fruit and vegetables a day, include whole grains    & fiber and avoid regularly eating high fat or \"junk\" foods?  Yes    Taking medications regularly:  Yes    Medication side effects:  Not applicable and Muscle aches    Ability to successfully perform activities of daily living:  Telephone requires assistance, shopping requires assistance, housework requires assistance, laundry requires assistance and no assistance needed    Home Safety:  Lack of grab bars in the bathroom    Hearing Impairment:  Feel that people are mumbling or not speaking clearly    In the past 6 months, have you been bothered by leaking of urine?  No    In general, how would you rate your overall mental or emotional health?  Good      PHQ-2 Total Score: 0    Additional concerns today:  No    Do you feel safe in your environment? Yes    Have you ever done Advance Care Planning? (For example, a Health Directive, POLST, or a discussion with a medical provider or your loved ones about your wishes): No, advance care planning information given to patient to review.  Patient plans to discuss their wishes with loved ones or provider.        Fall risk  Fallen 2 or more times in the past year?: No  Any fall with injury in the past year?: No    Cognitive Screening   1) Repeat 3 items (Leader, Season, Table)    2) Clock draw: NORMAL  3) 3 item recall: Recalls 3 objects  Results: 3 items recalled: COGNITIVE IMPAIRMENT LESS LIKELY    Mini-CogTM Copyright [employer] [place]. Licensed by the author for use in [facility] " From the way I read it she was just going to be referred but you can ask him to make sure, thanks! Services; reprinted with permission (soyusra@[facility]). All rights reserved.      Do you have sleep apnea, excessive snoring or daytime drowsiness?: no    Reviewed and updated as needed this visit by clinical staff   Tobacco  Allergies  Meds   Med Hx  Surg Hx  Fam Hx  Soc Hx          Reviewed and updated as needed this visit by Provider                   Social History     Tobacco Use     Smoking status: Never Smoker     Smokeless tobacco: Never Used   Substance Use Topics     Alcohol use: No       Alcohol Use 6/1/2022   Prescreen: >3 drinks/day or >7 drinks/week? No   Prescreen: >3 drinks/day or >7 drinks/week? -     [id number]}    Current providers sharing in care for this patient include:   Patient Care Team:  Aaseby-Aguilera, Ramona Ann, PA-C as PCP - General (Family Medicine)  Dave Bahena DO as Assigned PCP    The following health maintenance items are reviewed in Epic and correct as of today:  Health Maintenance Due   Topic Date Due     DTAP/TDAP/TD IMMUNIZATION (3 - Td or Tdap) 11/29/2021     COVID-19 Vaccine (4 - Booster for Pfizer series) 03/12/2022     BP Readings from Last 3 Encounters:   06/01/22 124/72   09/27/21 126/72   01/25/21 125/71    Wt Readings from Last 3 Encounters:   06/01/22 70.7 kg (155 lb 12.8 oz)   09/27/21 68.9 kg (152 lb)   10/23/20 65.6 kg (144 lb 9.6 oz)                  Recent Labs   Lab Test 06/01/22  1049 09/27/21  0827 10/23/20  1615 06/24/20  1025 06/01/18  1015 12/05/16  1111 11/09/15  1050   A1C  --   --   --   --  5.2  --  5.2   * 177*  --  159* 141*   < > 119   HDL 64 62  --  50 43*   < > 71   TRIG 106 130  --  152* 125   < > 130   ALT 32  --  47 38  --    < >  --    CR 0.72 0.79 0.64 0.73  --    < >  --    GFRESTIMATED 88 76 90 83  --    < >  --    GFRESTBLACK  --   --  >90 >90  --    < >  --    POTASSIUM 4.1 4.3 4.4 4.5  --    < >  --    TSH  --   --   --  1.73  --   --  2.15    < > = values in this interval not displayed.              Review of Systems  "  Constitutional: Negative for chills and fever.   HENT: Negative for congestion, ear pain, hearing loss and sore throat.    Eyes: Negative for pain and visual disturbance.   Respiratory: Negative for cough and shortness of breath.    Cardiovascular: Positive for peripheral edema. Negative for chest pain and palpitations.   Gastrointestinal: Negative for abdominal pain, constipation, diarrhea, heartburn, hematochezia and nausea.   Breasts:  Negative for tenderness, breast mass and discharge.   Genitourinary: Negative for dysuria, frequency, genital sores, hematuria, pelvic pain, urgency, vaginal bleeding and vaginal discharge.   Musculoskeletal: Positive for arthralgias and joint swelling. Negative for myalgias.   Skin: Negative for rash.   Neurological: Negative for dizziness, weakness, headaches and paresthesias.   Psychiatric/Behavioral: Negative for mood changes. The patient is not nervous/anxious.      Constitutional, HEENT, cardiovascular, pulmonary, gi and gu systems are negative, except as otherwise noted.    OBJECTIVE:   There were no vitals taken for this visit. Estimated body mass index is 25.69 kg/m  as calculated from the following:    Height as of 9/27/21: 1.638 m (5' 4.5\").    Weight as of 9/27/21: 68.9 kg (152 lb).  Physical Exam  GENERAL APPEARANCE: healthy, alert and no distress  EYES: Eyes grossly normal to inspection, PERRL and conjunctivae and sclerae normal  HENT: ear canals and TM's normal, nose and mouth without ulcers or lesions, oropharynx clear and oral mucous membranes moist  NECK: no adenopathy, no asymmetry, masses, or scars and thyroid normal to palpation  RESP: lungs clear to auscultation - no rales, rhonchi or wheezes  BREAST: normal without masses, tenderness or nipple discharge and no palpable axillary masses or adenopathy  CV: regular rate and rhythm, normal S1 S2, no S3 or S4, no murmur, click or rub, no peripheral edema and peripheral pulses strong  ABDOMEN: soft, nontender, no " "hepatosplenomegaly, no masses and bowel sounds normal  MS: no musculoskeletal defects are noted and gait is age appropriate without ataxia  SKIN: no suspicious lesions or rashes  NEURO: Normal strength and tone, sensory exam grossly normal, mentation intact and speech normal  PSYCH: mentation appears normal and affect normal/bright    Diagnostic Test Results:  Labs reviewed in Epic    ASSESSMENT / PLAN:   (Z00.00) Encounter for Medicare annual wellness exam  (primary encounter diagnosis)  Comment:   Plan:     (Z13.1) Screening for diabetes mellitus  Comment:   Plan: Comprehensive metabolic panel, CANCELED: Basic         metabolic panel            (Z13.220) Screening, lipid  Comment:   Plan: Lipid Profile (Chol, Trig, HDL, LDL calc)            (Z13.0) Screening for blood disease  Comment:   Plan: CBC with platelets            (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    Has fungus after getting acrylic nails done.   Plan: terbinafine (LAMISIL) 250 MG tablet                  COUNSELING:  Reviewed preventive health counseling, as reflected in patient instructions       Regular exercise       Healthy diet/nutrition       Vision screening       Hearing screening       Aspirin prophylaxis     Estimated body mass index is 25.69 kg/m  as calculated from the following:    Height as of 9/27/21: 1.638 m (5' 4.5\").    Weight as of 9/27/21: 68.9 kg (152 lb).        She reports that she has never smoked. She has never used smokeless tobacco.      Appropriate preventive services were discussed with this patient, including applicable screening as appropriate for cardiovascular disease, diabetes, osteopenia/osteoporosis, and glaucoma.  As appropriate for age/gender, discussed screening for colorectal cancer, prostate cancer, breast " cancer, and cervical cancer. Checklist reviewing preventive services available has been given to the patient.    Reviewed patients plan of care and provided an AVS. The Basic Care Plan (routine screening as documented in Health Maintenance) for Amelia meets the Care Plan requirement. This Care Plan has been established and reviewed with the Patient.    Counseling Resources:  ATP IV Guidelines  Pooled Cohorts Equation Calculator  Breast Cancer Risk Calculator  Breast Cancer: Medication to Reduce Risk  FRAX Risk Assessment  ICSI Preventive Guidelines  Dietary Guidelines for Americans, 2010  [employer] [facility]'s MyPlate  ASA Prophylaxis  Lung CA Screening    Ramona Ann Aaseby-Aguilera, PA-C  [facility]    Identified Health Risks:  Answers for HPI/ROS submitted by the patient on 6/1/2022  If you checked off any problems, how difficult have these problems made it for you to do your work, take care of things at home, or get along with other people?: Not difficult at all  PHQ9 TOTAL SCORE: 0  JANIA 7 TOTAL SCORE: 0

## 2022-06-01 NOTE — PATIENT INSTRUCTIONS
Patient Education   Personalized Prevention Plan  You are due for the preventive services outlined below.  Your care team is available to assist you in scheduling these services.  If you have already completed any of these items, please share that information with your care team to update in your medical record.  Health Maintenance Due   Topic Date Due     Diptheria Tetanus Pertussis (DTAP/TDAP/TD) Vaccine (3 - Td or Tdap) 11/29/2021     COVID-19 Vaccine (4 - Booster for Pfizer series) 03/12/2022

## 2022-06-01 NOTE — PROGRESS NOTES
Prior to immunization administration, verified patients identity using patient s name and date of birth. Please see Immunization Activity for additional information.     Screening Questionnaire for Adult Immunization    Are you sick today?   No   Do you have allergies to medications, food, a vaccine component or latex?   No   Have you ever had a serious reaction after receiving a vaccination?   No   Do you have a long-term health problem with heart, lung, kidney, or metabolic disease (e.g., diabetes), asthma, a blood disorder, no spleen, complement component deficiency, a cochlear implant, or a spinal fluid leak?  Are you on long-term aspirin therapy?   No   Do you have cancer, leukemia, HIV/AIDS, or any other immune system problem?   No   Do you have a parent, brother, or sister with an immune system problem?   No   In the past 3 months, have you taken medications that affect  your immune system, such as prednisone, other steroids, or anticancer drugs; drugs for the treatment of rheumatoid arthritis, Crohn s disease, or psoriasis; or have you had radiation treatments?   No   Have you had a seizure, or a brain or other nervous system problem?   No   During the past year, have you received a transfusion of blood or blood    products, or been given immune (gamma) globulin or antiviral drug?   No   For women: Are you pregnant or is there a chance you could become       pregnant during the next month?   No   Have you received any vaccinations in the past 4 weeks?   No     Immunization questionnaire answers were all negative.        Per orders of Dr. Gaviria, injection of TDAP given by Ghada Tan MA. Patient instructed to remain in clinic for 15 minutes afterwards, and to report any adverse reaction to me immediately.       Screening performed by Ghada Tan MA on 6/1/2022 at 10:56 AM.

## 2022-07-27 ENCOUNTER — OFFICE VISIT (OUTPATIENT)
Dept: FAMILY MEDICINE | Facility: CLINIC | Age: 72
End: 2022-07-27
Payer: COMMERCIAL

## 2022-07-27 VITALS
WEIGHT: 156 LBS | SYSTOLIC BLOOD PRESSURE: 114 MMHG | BODY MASS INDEX: 25.99 KG/M2 | DIASTOLIC BLOOD PRESSURE: 68 MMHG | TEMPERATURE: 98.3 F | RESPIRATION RATE: 14 BRPM | OXYGEN SATURATION: 97 % | HEIGHT: 65 IN | HEART RATE: 76 BPM

## 2022-07-27 DIAGNOSIS — J06.9 VIRAL URI: Primary | ICD-10-CM

## 2022-07-27 PROCEDURE — 99213 OFFICE O/P EST LOW 20 MIN: CPT | Performed by: PHYSICIAN ASSISTANT

## 2022-07-27 NOTE — PROGRESS NOTES
"  Assessment & Plan     (J06.9) Viral URI  (primary encounter diagnosis)  Comment:   Plan: resolving.  No need for antibiotic or cough suppressant.  Please follow-up if symptoms fail to resolve or worsen            [id number]}         No follow-ups on file.    Ramona Ann Aaseby-Aguilera, PA-C [facility] is a 71 year old, presenting for the following health issues:  SAMEERI      víctor went on fishing trip and got sicj==k with cough.  States she feels better and cough almost gone but she is bringing up lots of green discharge.  denies fever, ST, SOB or other symptoms     URI    History of Present Illness       Reason for visit:  Nausea andcougt    She eats 2-3 servings of fruits and vegetables daily.She consumes 1 sweetened beverage(s) daily.She exercises with enough effort to increase her heart rate 10 to 19 minutes per day.  She exercises with enough effort to increase her heart rate 3 or less days per week.   She is taking medications regularly.         Review of Systems   Constitutional, HEENT, cardiovascular, pulmonary, gi and gu systems are negative, except as otherwise noted.      Objective    /68 (BP Location: Right arm, Patient Position: Sitting, Cuff Size: Adult Regular)   Pulse 76   Temp 98.3  F (36.8  C) (Oral)   Resp 14   Ht 1.638 m (5' 4.5\")   Wt 70.8 kg (156 lb)   SpO2 97%   Breastfeeding No   BMI 26.36 kg/m    Body mass index is 26.36 kg/m .  Physical Exam   GENERAL: healthy, alert and no distress  HENT: ear canals and TM's normal, nose and mouth without ulcers or lesions  RESP: lungs clear to auscultation - no rales, rhonchi or wheezes  CV: regular rate and rhythm, normal S1 S2, no S3 or S4, no murmur, click or rub, no peripheral edema and peripheral pulses strong  ABDOMEN: soft, nontender, no hepatosplenomegaly, no masses and bowel sounds normal                    .  ..  "

## 2022-09-08 ENCOUNTER — VIRTUAL VISIT (OUTPATIENT)
Dept: FAMILY MEDICINE | Facility: CLINIC | Age: 72
End: 2022-09-08
Payer: COMMERCIAL

## 2022-09-08 DIAGNOSIS — U07.1 INFECTION DUE TO 2019 NOVEL CORONAVIRUS: Primary | ICD-10-CM

## 2022-09-08 PROCEDURE — 99213 OFFICE O/P EST LOW 20 MIN: CPT | Mod: CS | Performed by: INTERNAL MEDICINE

## 2022-09-08 NOTE — PROGRESS NOTES
Amelia is a 71 year old who is being evaluated via a billable telephone visit.      What phone number would you like to be contacted at? [phone]  How would you like to obtain your AVS? MyChart    Assessment & Plan     Infection due to 2019 novel coronavirus  Discussed pros and cons of paxlovid  Age > 65, I think she should start  Discussed potential risk and side effects   She is no longer taking wellbutrin  Discussed self isolation guidelines  Discussed red flag symptoms that warrant seeking medical care  Discussed symptom management strategies   - nirmatrelvir and ritonavir (PAXLOVID) therapy pack; Take 3 tablets by mouth 2 times daily for 5 days      14 minutes spent on the date of the encounter doing chart review, history and exam, documentation and further activities per the note           Return in about 1 week (around 9/15/2022) for recheck if symptoms persist.  Patient instructed to return to clinic or contact us sooner if symptoms worsen or new symptoms develop.      Brandon Ruvalcaba MD  [facility]   Amelia is a 71 year old accompanied by her spouse, presenting for the following health issues:  Covid Concern      HPI       COVID-19 Symptom Review  How many days ago did these symptoms start? TWO DAYS AGO    Are any of the following symptoms significant for you?    New or worsening difficulty breathing? No    Worsening cough? No    Fever or chills? Yes, the highest temperature was 100.2    Headache: YES    Sore throat: No    Chest pain: No    Diarrhea: No    Body aches? YES    What treatments has patient tried? Acetaminophen   Does patient live in a nursing home, group home, or shelter? No  Does patient have a way to get food/medications during quarantined? Yes, I have a friend or family member who can help me.        She does not take wellbutrin anymore     Review of Systems         Objective           Vitals:  No vitals were obtained today due to virtual  visit.    Physical Exam   healthy, alert and no distress  PSYCH: Alert and oriented times 3; coherent speech, normal   rate and volume, able to articulate logical thoughts, able   to abstract reason, no tangential thoughts, no hallucinations   or delusions  Her affect is normal  RESP: No cough, no audible wheezing, able to talk in full sentences  Remainder of exam unable to be completed due to telephone visits                Phone call duration: 8 minutes

## 2022-11-19 ENCOUNTER — HEALTH MAINTENANCE LETTER (OUTPATIENT)
Age: 72
End: 2022-11-19

## 2022-11-28 ENCOUNTER — HOSPITAL ENCOUNTER (OUTPATIENT)
Dept: MAMMOGRAPHY | Facility: CLINIC | Age: 72
Discharge: HOME | End: 2022-11-28
Attending: PHYSICIAN ASSISTANT | Admitting: PHYSICIAN ASSISTANT
Payer: COMMERCIAL

## 2022-11-28 DIAGNOSIS — Z12.31 VISIT FOR SCREENING MAMMOGRAM: ICD-10-CM

## 2022-11-28 PROCEDURE — 77067 SCR MAMMO BI INCL CAD: CPT

## 2023-06-26 ENCOUNTER — TRANSFERRED RECORDS (OUTPATIENT)
Dept: HEALTH INFORMATION MANAGEMENT | Facility: CLINIC | Age: 73
End: 2023-06-26
Payer: COMMERCIAL

## 2023-07-02 ENCOUNTER — HEALTH MAINTENANCE LETTER (OUTPATIENT)
Age: 73
End: 2023-07-02

## 2023-08-10 ENCOUNTER — MYC MEDICAL ADVICE (OUTPATIENT)
Dept: FAMILY MEDICINE | Facility: CLINIC | Age: 73
End: 2023-08-10
Payer: COMMERCIAL

## 2023-08-10 NOTE — TELEPHONE ENCOUNTER
Patient Quality Outreach    Patient is due for the following:   Physical Preventive Adult Physical    Next Steps:   Schedule a Annual Wellness Visit    Type of outreach:    Sent letter.      Questions for provider review:    None           Maddi Abrams CMA

## 2023-08-10 NOTE — LETTER
[facility]  [address] [address] [phone]  August 10, 2023  Amelia Ozuna  [address] [address]      Dear Amelia,    I care about your health and have reviewed your health plan. I have reviewed your medical conditions, medication list, and lab results and am making recommendations based on this review, to better manage your health.    You are in particular need of attention regarding:  -Wellness (Physical) Visit     I am recommending that you:  {recommendations:-schedule a WELLNESS (Physical) APPOINTMENT with me.   I will check fasting labs the same day - nothing to eat except water and meds for 8-10 hours prior.    Here is a list of Health Maintenance topics that are due now or due soon:  Health Maintenance Due   Topic Date Due    COVID-19 Vaccine (4 - Pfizer series) 01/07/2022    ANNUAL REVIEW OF HM ORDERS  09/27/2022    PHQ-9  12/01/2022    MEDICARE ANNUAL WELLNESS VISIT  06/01/2023    FALL RISK ASSESSMENT  09/08/2023       Please call us at [phone] (or use [employer]) to address the above recommendations.     Thank you for trusting [facility] and we appreciate the opportunity to serve you.  We look forward to supporting your healthcare needs in the future.    Healthy Regards,    [facility]

## 2023-08-10 NOTE — TELEPHONE ENCOUNTER
Patient Quality Outreach    Patient is due for the following:   Physical Preventive Adult Physical    Next Steps:   Schedule a Annual Wellness Visit    Type of outreach:    Sent [employer] message.      Questions for provider review:    None           Maddi Abrams CMA

## 2023-09-08 ENCOUNTER — TELEPHONE (OUTPATIENT)
Dept: FAMILY MEDICINE | Facility: CLINIC | Age: 73
End: 2023-09-08
Payer: COMMERCIAL

## 2023-09-08 NOTE — LETTER
September 19, 2023      Amelia Ozuna  [address] [address]        Dear Amelia,     We received a refill request for Wellbutrin for you.  We have been unable to reach you by phone after several attempts.     You have not been seen in clinic since June 2022.  You do need an office visit for on going refills.   Please call the clinic with any questions and to schedule an office visit.          Sincerely,         Ramona Ann Aaseby-Aguilera, PA-C

## 2023-09-11 NOTE — TELEPHONE ENCOUNTER
LOV was 7/2022 over one year ago. Patient will likely need appointment.     Refill Request (Bupropion HCL  MG TABLET   ) Not on current med list. Looks like was discontinued during Covid visit by provider--not sure why, perhaps patient was just to hold but provider removed from med list? See below and notes. Leila Devries R.N.      buPROPion (WELLBUTRIN XL) 150 MG 24 hr tablet (Discontinued) 90 tablet 3 6/1/2022 9/8/2022 No   Sig: TAKE 1 TABLET BY MOUTH EVERY DAY IN THE MORNING   Sent to pharmacy as: buPROPion HCl ER (XL) 150 MG Oral Tablet Extended Release 24 Hour (WELLBUTRIN XL)   Class: E-Prescribe   Order: [id number]   E-Prescribing Status: Receipt confirmed by pharmacy (6/1/2022 10:40 AM CDT)   E-Cancel Status: Request approved by pharmacy (9/8/2022 10:21 AM CDT)       E-Cancel Status Note: No prior dispensing     Printout Tracking    External Result Report     Medication Administration Instructions    TAKE 1 TABLET BY MOUTH EVERY DAY IN THE MORNING     Pharmacy    [id number] - [place], [place] - [address] [address]     This Order Has Been Discontinued    Order Status Reason By On   Discontinued None Brandon Ruvalcaba MD 9/8/22 2519

## 2023-09-12 NOTE — TELEPHONE ENCOUNTER
Call to patient to check on medication noted below. Unable to get through, "[employer] message sent.     Leila Devries R.N.

## 2023-10-05 ENCOUNTER — TELEPHONE (OUTPATIENT)
Dept: FAMILY MEDICINE | Facility: CLINIC | Age: 73
End: 2023-10-05
Payer: COMMERCIAL

## 2023-10-05 NOTE — TELEPHONE ENCOUNTER
Reason for Call:  Appointment Request    Patient requesting this type of appt:  office visit    Requested provider: Aaseby-Aguilera, Ramona Ann    Reason patient unable to be scheduled: Not within requested timeframe    When does patient want to be seen/preferred time: 3-7 days    Comments: Patient has had a cough for a month and would like to see primary next week     Could we send this information to you in [facility] or would you prefer to receive a phone call?:   Patient would prefer a phone call   Okay to leave a detailed message?: Yes at Cell number on file:    Telephone Information:   Mobile [phone]       Call taken on 10/5/2023 at 4:15 PM by Julieth Jensen

## 2023-10-05 NOTE — TELEPHONE ENCOUNTER
Patient notified Khadra is not here next week and that she can be seen in urgent care.  Reyna Taylor,

## 2023-10-06 ENCOUNTER — ANCILLARY PROCEDURE (OUTPATIENT)
Dept: GENERAL RADIOLOGY | Facility: CLINIC | Age: 73
End: 2023-10-06
Attending: PHYSICIAN ASSISTANT
Payer: COMMERCIAL

## 2023-10-06 ENCOUNTER — OFFICE VISIT (OUTPATIENT)
Dept: URGENT CARE | Facility: URGENT CARE | Age: 73
End: 2023-10-06
Payer: COMMERCIAL

## 2023-10-06 VITALS
HEART RATE: 76 BPM | BODY MASS INDEX: 25.35 KG/M2 | WEIGHT: 150 LBS | OXYGEN SATURATION: 98 % | TEMPERATURE: 98.3 F | DIASTOLIC BLOOD PRESSURE: 72 MMHG | SYSTOLIC BLOOD PRESSURE: 139 MMHG

## 2023-10-06 DIAGNOSIS — F33.8 SEASONAL AFFECTIVE DISORDER (H): ICD-10-CM

## 2023-10-06 DIAGNOSIS — R05.1 ACUTE COUGH: Primary | ICD-10-CM

## 2023-10-06 PROCEDURE — 71046 X-RAY EXAM CHEST 2 VIEWS: CPT | Mod: TC | Performed by: RADIOLOGY

## 2023-10-06 PROCEDURE — 99214 OFFICE O/P EST MOD 30 MIN: CPT | Performed by: PHYSICIAN ASSISTANT

## 2023-10-06 RX ORDER — METHYLPREDNISOLONE 4 MG
TABLET, DOSE PACK ORAL
COMMUNITY
Start: 2023-10-05

## 2023-10-06 RX ORDER — BUPROPION HYDROCHLORIDE 150 MG/1
150 TABLET ORAL EVERY MORNING
Qty: 30 TABLET | Refills: 5 | Status: SHIPPED | OUTPATIENT
Start: 2023-10-06 | End: 2024-04-11

## 2023-10-06 NOTE — PATIENT INSTRUCTIONS
Cough    You were seen today for a cough. This is likely due to a virus and will improve over the next 1-2 weeks on its own.    Symptom management:  - Drink plenty of non-caffeinated fluids  - Avoid smoke exposure  - May use tylenol or ibuprofen for discomfort  - Drink a warm non-caffeinated tea with honey  - Place a warm humidifier in your bedroom at night  - [employer]'s VaporRub    Reasons to return for re-evaluation:  - Develop a fever 100.4 or higher, current fever worsens, or fever does not improve in 72 hours  - Difficulty breathing or shortness of breath  - Cough continues to worsen including coughing up blood or coughing up thick, colored phlegm  - Unable to tolerate fluids    Otherwise, if symptoms have not improved in 7 days, follow-up with your primary care provider.

## 2023-10-06 NOTE — PROGRESS NOTES
Assessment & Plan:      Problem List Items Addressed This Visit    None  Visit Diagnoses       Acute cough    -  Primary    Relevant Orders    XR Chest 2 Views (Completed)    Seasonal affective disorder (H24)        Relevant Medications    buPROPion (WELLBUTRIN XL) 150 MG 24 hr tablet          Medical Decision Making  Patient presents with persisting cough for 1 month.  Chest x-ray is negative for focal pneumonia.  Suspect cough is likely secondary to postviral syndrome versus exposure to dust at home.  Continue to treat symptoms with conservative measures.  Refilled patient's bupropion as her symptoms have been under good control with this dose.  She will continue to follow-up with primary care as needed.     Subjective:      Amelia Ozuna is a 72 year old female here for evaluation of cough.  Onset of symptoms was 1 month ago.  Cough started before patient traveled to [place].  While she was there she saw a doctor who prescribed a cough medicine that helped temporarily.  Since patient has returned to the [facility] she has been staying at her son's house which is undergoing construction.  Patient thinks that the dust is causing the cough to persist.  No fevers or shortness of breath.  Patient does get right lateral chest discomfort while she is coughing.  Patient also notes some persisting pains in the right lower extremity after her flight.  She saw a provider for this as well that diagnosed her with radiculopathy.  Patient has also been trying to get into her primary care provider without success and needs a refill of her seasonal affective disorder medications.     The following portions of the patient's history were reviewed and updated as appropriate: allergies, current medications, and problem list.     Review of Systems  Pertinent items are noted in HPI.    Allergies  Allergies   Allergen Reactions    No Known Drug Allergy        Family History   Problem Relation Age of Onset    Alzheimer Disease Mother 92     Alcohol/Drug Father     Family History Negative Brother         3    Melanoma Brother     Neurologic Disorder Sister         3-one sister with hydrocephalous    Breast Cancer No family hx of     Cancer - colorectal No family hx of        Social History     Tobacco Use    Smoking status: Never    Smokeless tobacco: Never   Substance Use Topics    Alcohol use: No        Objective:      /72   Pulse 76   Temp 98.3  F (36.8  C) (Oral)   Wt 68 kg (150 lb)   SpO2 98%   BMI 25.35 kg/m    General appearance - alert, well appearing, and in no distress and non-toxic  Mouth - mucous membranes moist, pharynx normal without lesions  Neck - supple, no significant adenopathy  Chest - clear to auscultation, no wheezes, rales or rhonchi, symmetric air entry  Heart - normal rate, regular rhythm, normal S1, S2, no murmurs, rubs, clicks or gallops  Extremities - right lower extremity: Tenderness to palpation through the lateral and posterior thigh  Skin - right lower extremity: No erythema, swelling, or increased warmth to touch throughout the right lower extremity     Lab & Imaging Results    Results for orders placed or performed in visit on 10/06/23   XR Chest 2 Views     Status: None    Narrative    XR CHEST 2 VIEWS  10/6/2023 11:33 AM       INDICATION: cough x 1 month; rule out pneumonia  COMPARISON: 9/9/2013       Impression    IMPRESSION: Negative chest.    TERRENCE MARY MD         SYSTEM ID:  [id number]       I personally reviewed these results and discussed findings with the patient.    The use of Dragon/Kloc[employer] dictation services was used to construct the content of this note; any grammatical errors are non-intentional. Please contact the author directly if you are in need of any clarification.

## 2023-12-11 ENCOUNTER — OFFICE VISIT (OUTPATIENT)
Dept: DERMATOLOGY | Facility: CLINIC | Age: 73
End: 2023-12-11
Payer: COMMERCIAL

## 2023-12-11 DIAGNOSIS — L81.4 LENTIGINES: Primary | ICD-10-CM

## 2023-12-11 DIAGNOSIS — D48.5 NEOPLASM OF UNCERTAIN BEHAVIOR OF SKIN: ICD-10-CM

## 2023-12-11 DIAGNOSIS — L57.8 ACTINIC SKIN DAMAGE: ICD-10-CM

## 2023-12-11 PROCEDURE — 99214 OFFICE O/P EST MOD 30 MIN: CPT | Performed by: STUDENT IN AN ORGANIZED HEALTH CARE EDUCATION/TRAINING PROGRAM

## 2023-12-11 ASSESSMENT — PAIN SCALES - GENERAL: PAINLEVEL: NO PAIN (0)

## 2023-12-11 NOTE — PATIENT INSTRUCTIONS
The lesion on your eye appears consistent with a cyst  The bumps on your arms appear benign, possible fibrosis or cystic fat change from trauma given their location over bone and the symmetry on both sides     Keep an eye out for a call from the pharmacy [phone], they will be calling you to set up delivery of your medication. If you don't hear from them in the next 72 hours, call the number yourself

## 2023-12-11 NOTE — LETTER
12/11/2023         RE: Amelia Ozuna  [id number] [address] [facility] [id number]        Dear Colleague,    Thank you for referring your patient, Amelia Ozuna, to the [facility]. Please see a copy of my visit note below.    [facility] Dermatology Note    Encounter Date: Dec 11, 2023    Dermatology Problem List:    ______________________________________    Impression/Plan:  Amelia was seen today for lesion.    Diagnoses and all orders for this visit:      Actinic skin damage  Lentigines  -     COMPOUNDED NON-CONTROLLED SUBSTANCE (CMPD RX) - PHARMACY TO MIX COMPOUNDED MEDICATION; Apply daily for no longer than 3 months consecutively before taking a 1 month break  -Rec sunscreen every day  - 5% hydroquinone cream compounded in [place] to the dorsal hands daily for no longer than 3 months consecutively before taking a 1 month break    Neoplasm of uncertain behavior of skin  Very small slightly plantar subcutaneous growth bilateral ulnar forearms possibly consistent with fat necrosis or lipoma, or fibrosis from trauma  - Either way the history of it being relatively stable in size and how clinically inconspicuous the lesions are is reassuring  - If they start to grow can ultrasound them    Follow-up PRN .       Staff Involved:  Staff Only    Homar Padilla MD   of Dermatology  Department of Dermatology  [facility]      CC:   Chief Complaint   Patient presents with     Lesion       History of Present Illness:  Ms. Amelia Ozuna is a 73 year old female who presents as a return patient.    She was last seen in January 2021 at which time actinic cheilitis was treated with liquid nitrogen as well as an I-S K biopsy was taken of the forehead which showed a dilated pore of Altagracia.    Labs:      Physical exam:  Vitals: There were no vitals taken for this visit.  GEN: well developed, well-nourished, in no  acute distress, in a pleasant mood.     SKIN: Dan phototype 1  - Sun-exposed skin, which includes the head/face, neck, both arms, digits, and/or nails was examined.   - Flat brown macules and patches in a sun exposed areas on face and extremities  - No other lesions of concern on areas examined.     Past Medical History:   Past Medical History:   Diagnosis Date     Actinic keratosis      Leiomyoma of uterus, unspecified 10/2000     Past Surgical History:   Procedure Laterality Date     [facility] LIGATE FALLOPIAN TUBE,POSTPARTUM  1981    Tubal Ligation     [facility] NONSPECIFIC PROCEDURE      Tonsillectomy     [facility] NONSPECIFIC PROCEDURE      Oophorectomy benign serous cystadenoma (L)       Social History:   reports that she has never smoked. She has never used smokeless tobacco. She reports that she does not drink alcohol and does not use drugs.    Family History:  Family History   Problem Relation Age of Onset     Alzheimer Disease Mother 92     Alcohol/Drug Father      Family History Negative Brother         3     Melanoma Brother      Neurologic Disorder Sister         3-one sister with hydrocephalous     Breast Cancer No family hx of      Cancer - colorectal No family hx of        Medications:  Current Outpatient Medications   Medication Sig Dispense Refill     COMPOUNDED NON-CONTROLLED SUBSTANCE (CMPD RX) - PHARMACY TO MIX COMPOUNDED MEDICATION Apply daily for no longer than 3 months consecutively before taking a 1 month break 30 g 3     buPROPion (WELLBUTRIN XL) 150 MG 24 hr tablet Take 1 tablet (150 mg) by mouth every morning 30 tablet 5     Cholecalciferol (VITAMIN D PO)        MAGNESIUM PO        methylPREDNISolone (MEDROL DOSEPAK) 4 MG tablet therapy pack  (Patient not taking: Reported on 10/6/2023)       Multiple Vitamins-Minerals (ZINC PO)        SESAME OIL Once in a day       Allergies   Allergen Reactions     No Known Drug Allergy                Again, thank you for allowing me to participate in the care of  your patient.        Sincerely,        Homar Padilla MD

## 2023-12-11 NOTE — PROGRESS NOTES
[facility] Dermatology Note    Encounter Date: Dec 11, 2023    Dermatology Problem List:    ______________________________________    Impression/Plan:  Amelia was seen today for lesion.    Diagnoses and all orders for this visit:      Actinic skin damage  Lentigines  -     COMPOUNDED NON-CONTROLLED SUBSTANCE (CMPD RX) - PHARMACY TO MIX COMPOUNDED MEDICATION; Apply daily for no longer than 3 months consecutively before taking a 1 month break  -Rec sunscreen every day  - 5% hydroquinone cream compounded in [place] to the dorsal hands daily for no longer than 3 months consecutively before taking a 1 month break    Neoplasm of uncertain behavior of skin  Very small slightly plantar subcutaneous growth bilateral ulnar forearms possibly consistent with fat necrosis or lipoma, or fibrosis from trauma  - Either way the history of it being relatively stable in size and how clinically inconspicuous the lesions are is reassuring  - If they start to grow can ultrasound them    Follow-up PRN .       Staff Involved:  Staff Only    Homar Padilla MD   of Dermatology  Department of Dermatology  [facility]      CC:   Chief Complaint   Patient presents with    Lesion       History of Present Illness:  Ms. Amelia Ozuna is a 73 year old female who presents as a return patient.    She was last seen in January 2021 at which time actinic cheilitis was treated with liquid nitrogen as well as an I-S K biopsy was taken of the forehead which showed a dilated pore of Altagracia.    Labs:      Physical exam:  Vitals: There were no vitals taken for this visit.  GEN: well developed, well-nourished, in no acute distress, in a pleasant mood.     SKIN: Dan phototype 1  - Sun-exposed skin, which includes the head/face, neck, both arms, digits, and/or nails was examined.   - Flat brown macules and patches in a sun exposed areas on face and extremities  - No other  lesions of concern on areas examined.     Past Medical History:   Past Medical History:   Diagnosis Date    Actinic keratosis     Leiomyoma of uterus, unspecified 10/2000     Past Surgical History:   Procedure Laterality Date    [facility] LIGATE FALLOPIAN TUBE,POSTPARTUM  1981    Tubal Ligation    [facility] NONSPECIFIC PROCEDURE      Tonsillectomy    [facility] NONSPECIFIC PROCEDURE      Oophorectomy benign serous cystadenoma (L)       Social History:   reports that she has never smoked. She has never used smokeless tobacco. She reports that she does not drink alcohol and does not use drugs.    Family History:  Family History   Problem Relation Age of Onset    Alzheimer Disease Mother 92    Alcohol/Drug Father     Family History Negative Brother         3    Melanoma Brother     Neurologic Disorder Sister         3-one sister with hydrocephalous    Breast Cancer No family hx of     Cancer - colorectal No family hx of        Medications:  Current Outpatient Medications   Medication Sig Dispense Refill    COMPOUNDED NON-CONTROLLED SUBSTANCE (CMPD RX) - PHARMACY TO MIX COMPOUNDED MEDICATION Apply daily for no longer than 3 months consecutively before taking a 1 month break 30 g 3    buPROPion (WELLBUTRIN XL) 150 MG 24 hr tablet Take 1 tablet (150 mg) by mouth every morning 30 tablet 5    Cholecalciferol (VITAMIN D PO)       MAGNESIUM PO       methylPREDNISolone (MEDROL DOSEPAK) 4 MG tablet therapy pack  (Patient not taking: Reported on 10/6/2023)      Multiple Vitamins-Minerals (ZINC PO)       SESAME OIL Once in a day       Allergies   Allergen Reactions    No Known Drug Allergy

## 2023-12-12 DIAGNOSIS — L81.4 LENTIGINES: ICD-10-CM

## 2023-12-26 ENCOUNTER — HOSPITAL ENCOUNTER (OUTPATIENT)
Dept: MAMMOGRAPHY | Facility: CLINIC | Age: 73
Discharge: HOME | End: 2023-12-26
Attending: PHYSICIAN ASSISTANT | Admitting: PHYSICIAN ASSISTANT
Payer: COMMERCIAL

## 2023-12-26 DIAGNOSIS — Z12.31 VISIT FOR SCREENING MAMMOGRAM: ICD-10-CM

## 2023-12-26 PROCEDURE — 77067 SCR MAMMO BI INCL CAD: CPT

## 2024-04-06 DIAGNOSIS — F33.8 SEASONAL AFFECTIVE DISORDER (H): ICD-10-CM

## 2024-04-11 RX ORDER — BUPROPION HYDROCHLORIDE 150 MG/1
150 TABLET ORAL EVERY MORNING
Qty: 30 TABLET | Refills: 0 | Status: SHIPPED | OUTPATIENT
Start: 2024-04-11

## 2024-05-02 ENCOUNTER — MYC MEDICAL ADVICE (OUTPATIENT)
Dept: FAMILY MEDICINE | Facility: CLINIC | Age: 74
End: 2024-05-02
Payer: COMMERCIAL

## 2024-05-02 NOTE — TELEPHONE ENCOUNTER
Patient Quality Outreach    Patient is due for the following:   Depression  -  PHQ-9 needed    Next Steps:   Patient was assigned appropriate questionnaire to complete    Type of outreach:    Sent [employer] message.    Next Steps:  Reach out within 90 days via [employer].    Max number of attempts reached: No. Will try again in 90 days if patient still on fail list.    Questions for provider review:    None           Elena Mims CMA  Chart routed to Care Team.

## 2024-06-19 ENCOUNTER — OFFICE VISIT (OUTPATIENT)
Dept: FAMILY MEDICINE | Facility: CLINIC | Age: 74
End: 2024-06-19
Payer: COMMERCIAL

## 2024-06-19 VITALS
OXYGEN SATURATION: 95 % | TEMPERATURE: 98.2 F | DIASTOLIC BLOOD PRESSURE: 83 MMHG | BODY MASS INDEX: 24.16 KG/M2 | SYSTOLIC BLOOD PRESSURE: 128 MMHG | RESPIRATION RATE: 16 BRPM | HEART RATE: 61 BPM | WEIGHT: 145 LBS | HEIGHT: 65 IN

## 2024-06-19 DIAGNOSIS — Z13.1 SCREENING FOR DIABETES MELLITUS: ICD-10-CM

## 2024-06-19 DIAGNOSIS — R07.89 CHEST WALL PAIN: Primary | ICD-10-CM

## 2024-06-19 DIAGNOSIS — Z13.0 SCREENING FOR BLOOD DISEASE: ICD-10-CM

## 2024-06-19 DIAGNOSIS — E78.5 HYPERLIPIDEMIA LDL GOAL <130: ICD-10-CM

## 2024-06-19 PROCEDURE — 99213 OFFICE O/P EST LOW 20 MIN: CPT | Performed by: PHYSICIAN ASSISTANT

## 2024-06-19 ASSESSMENT — PATIENT HEALTH QUESTIONNAIRE - PHQ9
10. IF YOU CHECKED OFF ANY PROBLEMS, HOW DIFFICULT HAVE THESE PROBLEMS MADE IT FOR YOU TO DO YOUR WORK, TAKE CARE OF THINGS AT HOME, OR GET ALONG WITH OTHER PEOPLE: NOT DIFFICULT AT ALL
SUM OF ALL RESPONSES TO PHQ QUESTIONS 1-9: 1
SUM OF ALL RESPONSES TO PHQ QUESTIONS 1-9: 1

## 2024-06-19 NOTE — PROGRESS NOTES
Assessment & Plan     Chest wall pain  Will get a ct of chest  to rule out lung disease or other concerns.   - CT Chest w/o Contrast; Future    Hyperlipidemia LDL goal <130    - Lipid Profile; Future    Screening for diabetes mellitus    - Comprehensive metabolic panel; Future    Screening for blood disease    - CBC with platelets; Future                Subjective   Amelia is a 73 year old, presenting for the following health issues:  Referral (Pulmonology )        6/19/2024    12:55 PM   Additional Questions   Roomed by MARK Clifford   Accompanied by Self       Amelia: was in [place] and saw a health care practitioner who told her she had a spot on her lung and needed follow-up.  No other documentation presented.  She states she had covid at one point and fell a few months ago and severely bruised the left side of her chest under her left breast.        History of Present Illness       Reason for visit:  Pulmonology Referral  Symptom onset:  3-4 weeks ago  Symptoms include:  Chest Pain  Symptom intensity:  Mild  Symptom progression:  Staying the same  Had these symptoms before:  No  What makes it worse:  No  What makes it better:  No    She eats 2-3 servings of fruits and vegetables daily.She consumes 1 sweetened beverage(s) daily.She exercises with enough effort to increase her heart rate 10 to 19 minutes per day.  She exercises with enough effort to increase her heart rate 4 days per week.   She is taking medications regularly.           Review of Systems  CONSTITUTIONAL: NEGATIVE for fever, chills, change in weight  INTEGUMENTARY/SKIN: NEGATIVE for worrisome rashes, moles or lesions  EYES: NEGATIVE for vision changes or irritation  ENT/MOUTH: NEGATIVE for ear, mouth and throat problems  RESP: NEGATIVE for significant cough or SOB  BREAST: NEGATIVE for masses, tenderness or discharge  CV: NEGATIVE for chest pain, palpitations or peripheral edema  GI: NEGATIVE for nausea, abdominal pain, heartburn, or  "change in bowel habits  : NEGATIVE for frequency, dysuria, or hematuria  MUSCULOSKELETAL: NEGATIVE for significant arthralgias or myalgia  NEURO: NEGATIVE for weakness, dizziness or paresthesias  ENDOCRINE: NEGATIVE for temperature intolerance, skin/hair changes  HEME: NEGATIVE for bleeding problems  PSYCHIATRIC: NEGATIVE for changes in mood or affect      Objective    BP (!) 145/79 (BP Location: Left arm, Patient Position: Sitting, Cuff Size: Adult Regular)   Pulse 67   Temp 98.2  F (36.8  C) (Oral)   Resp 16   Ht 1.638 m (5' 4.5\")   Wt 65.8 kg (145 lb)   LMP  (LMP Unknown)   SpO2 95%   Breastfeeding No   BMI 24.50 kg/m    Body mass index is 24.5 kg/m .  Physical Exam   GENERAL: alert and no distress  HENT: ear canals and TM's normal, nose and mouth without ulcers or lesions  RESP: lungs clear to auscultation - no rales, rhonchi or wheezes  CV: regular rate and rhythm, normal S1 S2, no S3 or S4, no murmur, click or rub, no peripheral edema   ABDOMEN: soft, nontender, no hepatosplenomegaly, no masses and bowel sounds normal  SKIN: no suspicious lesions or rashes  NEURO: Normal strength and tone, mentation intact and speech normal  PSYCH: mentation appears normal, affect normal/bright            Signed Electronically by: Ramona Ann Aaseby-Aguilera, PA-C    Answers submitted by the patient for this visit:  Patient Health Questionnaire (Submitted on 6/19/2024)  If you checked off any problems, how difficult have these problems made it for you to do your work, take care of things at home, or get along with other people?: Not difficult at all  PHQ9 TOTAL SCORE: 1  General Questionnaire (Submitted on 6/19/2024)  Chief Complaint: Chronic problems general questions HPI Form  How many servings of fruits and vegetables do you eat daily?: 2-3  On average, how many sweetened beverages do you drink each day (Examples: soda, juice, sweet tea, etc.  Do NOT count diet or artificially sweetened beverages)?: 1  How many " minutes a day do you exercise enough to make your heart beat faster?: 10 to 19  How many days a week do you exercise enough to make your heart beat faster?: 4  How many days per week do you miss taking your medication?: 0  General Concern (Submitted on 6/19/2024)  Chief Complaint: Chronic problems general questions HPI Form  What is the reason for your visit today?: Pulmonology Referral  When did your symptoms begin?: 3-4 weeks ago  What are your symptoms?: Chest Pain  How would you describe these symptoms?: Mild  Are your symptoms:: Staying the same  Have you had these symptoms before?: No  Is there anything that makes you feel worse?: No  Is there anything that makes you feel better?: No

## 2024-07-10 ENCOUNTER — HOSPITAL ENCOUNTER (OUTPATIENT)
Dept: CT IMAGING | Facility: CLINIC | Age: 74
Discharge: HOME | End: 2024-07-10
Attending: PHYSICIAN ASSISTANT | Admitting: PHYSICIAN ASSISTANT
Payer: COMMERCIAL

## 2024-07-10 DIAGNOSIS — R07.89 CHEST WALL PAIN: ICD-10-CM

## 2024-07-10 PROCEDURE — 71250 CT THORAX DX C-: CPT

## 2024-08-23 ENCOUNTER — LAB (OUTPATIENT)
Dept: LAB | Facility: CLINIC | Age: 74
End: 2024-08-23
Payer: COMMERCIAL

## 2024-08-23 DIAGNOSIS — Z13.0 SCREENING FOR BLOOD DISEASE: ICD-10-CM

## 2024-08-23 DIAGNOSIS — E78.5 HYPERLIPIDEMIA LDL GOAL <130: ICD-10-CM

## 2024-08-23 DIAGNOSIS — Z13.1 SCREENING FOR DIABETES MELLITUS: ICD-10-CM

## 2024-08-23 LAB
ALBUMIN SERPL BCG-MCNC: 4.5 G/DL (ref 3.5–5.2)
ALP SERPL-CCNC: 95 U/L (ref 40–150)
ALT SERPL W P-5'-P-CCNC: 22 U/L (ref 0–50)
ANION GAP SERPL CALCULATED.3IONS-SCNC: 10 MMOL/L (ref 7–15)
AST SERPL W P-5'-P-CCNC: 30 U/L (ref 0–45)
BILIRUB SERPL-MCNC: 0.4 MG/DL
BUN SERPL-MCNC: 16.6 MG/DL (ref 8–23)
CALCIUM SERPL-MCNC: 9.3 MG/DL (ref 8.8–10.4)
CHLORIDE SERPL-SCNC: 102 MMOL/L (ref 98–107)
CHOLEST SERPL-MCNC: 251 MG/DL
CREAT SERPL-MCNC: 0.82 MG/DL (ref 0.51–0.95)
EGFRCR SERPLBLD CKD-EPI 2021: 75 ML/MIN/1.73M2
ERYTHROCYTE [DISTWIDTH] IN BLOOD BY AUTOMATED COUNT: 12.3 % (ref 10–15)
FASTING STATUS PATIENT QL REPORTED: YES
FASTING STATUS PATIENT QL REPORTED: YES
GLUCOSE SERPL-MCNC: 83 MG/DL (ref 70–99)
HCO3 SERPL-SCNC: 28 MMOL/L (ref 22–29)
HCT VFR BLD AUTO: 43.9 % (ref 35–47)
HDLC SERPL-MCNC: 62 MG/DL
HGB BLD-MCNC: 14.5 G/DL (ref 11.7–15.7)
LDLC SERPL CALC-MCNC: 158 MG/DL
MCH RBC QN AUTO: 29.7 PG (ref 26.5–33)
MCHC RBC AUTO-ENTMCNC: 33 G/DL (ref 31.5–36.5)
MCV RBC AUTO: 90 FL (ref 78–100)
NONHDLC SERPL-MCNC: 189 MG/DL
PLATELET # BLD AUTO: 234 10E3/UL (ref 150–450)
POTASSIUM SERPL-SCNC: 4.6 MMOL/L (ref 3.4–5.3)
PROT SERPL-MCNC: 6.9 G/DL (ref 6.4–8.3)
RBC # BLD AUTO: 4.88 10E6/UL (ref 3.8–5.2)
SODIUM SERPL-SCNC: 140 MMOL/L (ref 135–145)
TRIGL SERPL-MCNC: 157 MG/DL
WBC # BLD AUTO: 5.5 10E3/UL (ref 4–11)

## 2024-08-23 PROCEDURE — 85027 COMPLETE CBC AUTOMATED: CPT | Performed by: PHYSICIAN ASSISTANT

## 2024-08-23 PROCEDURE — 36415 COLL VENOUS BLD VENIPUNCTURE: CPT | Performed by: PHYSICIAN ASSISTANT

## 2024-08-23 PROCEDURE — 80053 COMPREHEN METABOLIC PANEL: CPT | Performed by: PHYSICIAN ASSISTANT

## 2024-08-23 PROCEDURE — 80061 LIPID PANEL: CPT | Performed by: PHYSICIAN ASSISTANT

## 2024-08-25 ENCOUNTER — HEALTH MAINTENANCE LETTER (OUTPATIENT)
Age: 74
End: 2024-08-25

## 2024-08-30 ENCOUNTER — OFFICE VISIT (OUTPATIENT)
Dept: FAMILY MEDICINE | Facility: CLINIC | Age: 74
End: 2024-08-30
Payer: COMMERCIAL

## 2024-08-30 VITALS
RESPIRATION RATE: 16 BRPM | HEART RATE: 74 BPM | DIASTOLIC BLOOD PRESSURE: 80 MMHG | TEMPERATURE: 98.8 F | HEIGHT: 65 IN | SYSTOLIC BLOOD PRESSURE: 120 MMHG | BODY MASS INDEX: 24.16 KG/M2 | OXYGEN SATURATION: 97 % | WEIGHT: 145 LBS

## 2024-08-30 DIAGNOSIS — E78.5 HYPERLIPIDEMIA LDL GOAL <100: ICD-10-CM

## 2024-08-30 DIAGNOSIS — Z13.29 SCREENING FOR THYROID DISORDER: ICD-10-CM

## 2024-08-30 DIAGNOSIS — Z00.00 ENCOUNTER FOR MEDICARE ANNUAL WELLNESS EXAM: ICD-10-CM

## 2024-08-30 DIAGNOSIS — R91.8 PULMONARY NODULES: Primary | ICD-10-CM

## 2024-08-30 LAB — TSH SERPL DL<=0.005 MIU/L-ACNC: 3.05 UIU/ML (ref 0.3–4.2)

## 2024-08-30 PROCEDURE — 36415 COLL VENOUS BLD VENIPUNCTURE: CPT | Performed by: PHYSICIAN ASSISTANT

## 2024-08-30 PROCEDURE — G0439 PPPS, SUBSEQ VISIT: HCPCS | Performed by: PHYSICIAN ASSISTANT

## 2024-08-30 PROCEDURE — 84443 ASSAY THYROID STIM HORMONE: CPT | Performed by: PHYSICIAN ASSISTANT

## 2024-08-30 PROCEDURE — 99214 OFFICE O/P EST MOD 30 MIN: CPT | Mod: 25 | Performed by: PHYSICIAN ASSISTANT

## 2024-08-30 RX ORDER — ATORVASTATIN CALCIUM 10 MG/1
10 TABLET, FILM COATED ORAL DAILY
Qty: 90 TABLET | Refills: 3 | Status: SHIPPED | OUTPATIENT
Start: 2024-08-30

## 2024-08-30 SDOH — HEALTH STABILITY: PHYSICAL HEALTH: ON AVERAGE, HOW MANY MINUTES DO YOU ENGAGE IN EXERCISE AT THIS LEVEL?: 20 MIN

## 2024-08-30 SDOH — HEALTH STABILITY: PHYSICAL HEALTH: ON AVERAGE, HOW MANY DAYS PER WEEK DO YOU ENGAGE IN MODERATE TO STRENUOUS EXERCISE (LIKE A BRISK WALK)?: 3 DAYS

## 2024-08-30 ASSESSMENT — SOCIAL DETERMINANTS OF HEALTH (SDOH): HOW OFTEN DO YOU GET TOGETHER WITH FRIENDS OR RELATIVES?: MORE THAN THREE TIMES A WEEK

## 2024-08-30 ASSESSMENT — PAIN SCALES - GENERAL: PAINLEVEL: NO PAIN (0)

## 2024-08-30 NOTE — PROGRESS NOTES
Preventive Care Visit  [facility] PA-C, Family Medicine  Aug 30, 2024      Assessment & Plan     Encounter for Medicare annual wellness exam  Age and gender appropriate preventive care and screenings are discussed.  Particular attention to personal preventive care and age appropriate lifestyle including the incorporation of healthy diet and physical activity is made       Pulmonary nodules  Amelia never smoked but lives with a smoker. Will repeat CT in 1 year.   - CT Chest w/o Contrast; Future    Hyperlipidemia LDL goal <100  Risks, benefits, side effects and intended purposes discussed.    Ascvd score is `13  - atorvastatin (LIPITOR) 10 MG tablet; Take 1 tablet (10 mg) by mouth daily.    Screening for thyroid disorder    - TSH with free T4 reflex            Counseling  Appropriate preventive services were addressed with this patient via screening, questionnaire, or discussion as appropriate for fall prevention, nutrition, physical activity, Tobacco-use cessation, social engagement, weight loss and cognition.  Checklist reviewing preventive services available has been given to the patient.  Reviewed patient's diet, addressing concerns and/or questions.   She is at risk for lack of exercise and has been provided with information to increase physical activity for the benefit of her well-being.           Subjective   Amelia is a 73 year old, presenting for the following:  Medicare Visit (Here today for her Annual Wellness Visit. Review lab results and CT scan from last week. Needs refills on her Buproprion. )    The 10-year ASCVD risk score (Mio VÁZQUEZ, et al., 2019) is: 13.2%    Values used to calculate the score:      Age: 73 years      Sex: Female      Is Non- : No      Diabetic: No      Tobacco smoker: No      Systolic Blood Pressure: 120 mmHg      Is BP treated: No      HDL Cholesterol: 62 mg/dL      Total Cholesterol: 251  mg/dL            8/30/2024     3:30 PM   Additional Questions   Roomed by Mago Mims CMA   Accompanied by Self         Health Care Directive  Patient does not have a Health Care Directive or Living Will: Patient states has Advance Directive and will bring in a copy to clinic.    HPI    Results -   Taking Medication as prescribed: yes  Side Effects:  None  Medication Helping Symptoms:  yes          8/30/2024   General Health   How would you rate your overall physical health? Good   Feel stress (tense, anxious, or unable to sleep) Not at all            8/30/2024   Nutrition   Diet: I don't know            8/30/2024   Exercise   Days per week of moderate/strenous exercise 3 days   Average minutes spent exercising at this level 20 min            8/30/2024   Social Factors   Frequency of gathering with friends or relatives More than three times a week   Worry food won't last until get money to buy more No   Food not last or not have enough money for food? No   Do you have housing? (Housing is defined as stable permanent housing and does not include staying ouside in a car, in a tent, in an abandoned building, in an overnight shelter, or couch-surfing.) Yes   Are you worried about losing your housing? No   Lack of transportation? No   Unable to get utilities (heat,electricity)? No            8/30/2024   Fall Risk   Fallen 2 or more times in the past year? No   Trouble with walking or balance? No             8/30/2024   Activities of Daily Living- Home Safety   Needs help with the following daily activites None of the above   Safety concerns in the home None of the above            8/30/2024   Dental   Dentist two times every year? Yes            8/30/2024   Hearing Screening   Hearing concerns? None of the above            8/30/2024   Driving Risk Screening   Patient/family members have concerns about driving No            8/30/2024   General Alertness/Fatigue Screening   Have you been more tired than usual lately? No             8/30/2024   Urinary Incontinence Screening   Bothered by leaking urine in past 6 months No            8/30/2024   TB Screening   Were you born outside of the [place]? No          Today's PHQ-9 Score:       8/30/2024     3:19 PM   PHQ-9 SCORE   PHQ-9 Total Score MyChart 0   PHQ-9 Total Score 0         8/30/2024   Substance Use   Alcohol more than 3/day or more than 7/wk No   Do you have a current opioid prescription? No   How severe/bad is pain from 1 to 10? 0/10 (No Pain)   Do you use any other substances recreationally? No        Social History     Tobacco Use    Smoking status: Never    Smokeless tobacco: Never   Vaping Use    Vaping status: Never Used   Substance Use Topics    Alcohol use: No    Drug use: No           12/26/2023   LAST FHS-7 RESULTS   1st degree relative breast or ovarian cancer Yes   Any relative bilateral breast cancer No   Any male have breast cancer No   Any ONE woman have BOTH breast AND ovarian cancer No   Any woman with breast cancer before 50yrs No   2 or more relatives with breast AND/OR ovarian cancer Yes   2 or more relatives with breast AND/OR bowel cancer No           Mammogram Screening - Mammogram every 1-2 years updated in Health Maintenance based on mutual decision making    ASCVD Risk   The 10-year ASCVD risk score (Mio VÁZQUEZ, et al., 2019) is: 13.2%    Values used to calculate the score:      Age: 73 years      Sex: Female      Is Non- : No      Diabetic: No      Tobacco smoker: No      Systolic Blood Pressure: 120 mmHg      Is BP treated: No      HDL Cholesterol: 62 mg/dL      Total Cholesterol: 251 mg/dL            Reviewed and updated as needed this visit by Provider                    BP Readings from Last 3 Encounters:   08/30/24 120/80   06/19/24 128/83   10/06/23 139/72    Wt Readings from Last 3 Encounters:   08/30/24 65.8 kg (145 lb)   06/19/24 65.8 kg (145 lb)   10/06/23 68 kg (150 lb)                  Patient Active Problem List    Diagnosis    Hypercholesterolemia    HYPERLIPIDEMIA LDL GOAL <130    BPPV (benign paroxysmal positional vertigo)    Seborrheic keratoses     Past Surgical History:   Procedure Laterality Date    [facility] LIGATE FALLOPIAN TUBE,POSTPARTUM  1981    Tubal Ligation    [facility] NONSPECIFIC PROCEDURE      Tonsillectomy    [facility] NONSPECIFIC PROCEDURE      Oophorectomy benign serous cystadenoma (L)       Social History     Tobacco Use    Smoking status: Never    Smokeless tobacco: Never   Substance Use Topics    Alcohol use: No     Family History   Problem Relation Age of Onset    Alzheimer Disease Mother 92    Alcohol/Drug Father     Family History Negative Brother         3    Melanoma Brother     Neurologic Disorder Sister         3-one sister with hydrocephalous    Breast Cancer No family hx of     Cancer - colorectal No family hx of          Current Outpatient Medications   Medication Sig Dispense Refill    atorvastatin (LIPITOR) 10 MG tablet Take 1 tablet (10 mg) by mouth daily. 90 tablet 3    buPROPion (WELLBUTRIN XL) 150 MG 24 hr tablet Take 1 tablet (150 mg) by mouth every morning 30 tablet 0    Cholecalciferol (VITAMIN D PO)       COMPOUNDED NON-CONTROLLED SUBSTANCE (CMPD RX) - PHARMACY TO MIX COMPOUNDED MEDICATION Apply daily for no longer than 3 months consecutively before taking a 1 month break 30 g 3    MAGNESIUM PO       methylPREDNISolone (MEDROL DOSEPAK) 4 MG tablet therapy pack       Multiple Vitamins-Minerals (ZINC PO)       SESAME OIL Once in a day       Allergies   Allergen Reactions    No Known Drug Allergy      Recent Labs   Lab Test 08/23/24  0741 06/01/22  1049 09/27/21  0827 09/27/21  0827 10/23/20  1615 06/24/20  1025 06/01/18  1015   A1C  --   --   --   --   --   --  5.2   * 168*  --  177*  --  159* 141*   HDL 62 64  --  62  --  50 43*   TRIG 157* 106  --  130  --  152* 125   ALT 22 32  --   --  47 38  --    CR 0.82 0.72   < > 0.79 0.64 0.73  --    GFRESTIMATED 75 88   < > 76 90 83  --    GFRESTBLACK  " --   --   --   --  >90 >90  --    POTASSIUM 4.6 4.1   < > 4.3 4.4 4.5  --    TSH  --   --   --   --   --  1.73  --     < > = values in this interval not displayed.      Current providers sharing in care for this patient include:  Patient Care Team:  Aaseby-Aguilera, Ramona Ann, PA-C as PCP - General (Family Medicine)  Homar Padilla MD as MD (Dermatology)  Homar Padilla MD as Assigned Surgical Provider  Aaseby-Aguilera, Ramona Ann, PA-C as Assigned PCP    The following health maintenance items are reviewed in Epic and correct as of today:  Health Maintenance   Topic Date Due    MEDICARE ANNUAL WELLNESS VISIT  06/01/2023    COVID-19 Vaccine (5 - 2023-24 season) 02/22/2024    INFLUENZA VACCINE (1) 09/01/2024    MAMMO SCREENING  12/26/2024    PHQ-9  02/28/2025    ANNUAL REVIEW OF HM ORDERS  06/19/2025    LIPID  08/23/2025    FALL RISK ASSESSMENT  08/30/2025    ADVANCE CARE PLANNING  06/03/2027    GLUCOSE  08/23/2027    COLORECTAL CANCER SCREENING  09/11/2027    DTAP/TDAP/TD IMMUNIZATION (3 - Td or Tdap) 06/01/2032    DEXA  09/24/2033    HEPATITIS C SCREENING  Completed    DEPRESSION ACTION PLAN  Completed    Pneumococcal Vaccine: 65+ Years  Completed    ZOSTER IMMUNIZATION  Completed    RSV VACCINE  Completed    HPV IMMUNIZATION  Aged Out    MENINGITIS IMMUNIZATION  Aged Out    RSV MONOCLONAL ANTIBODY  Aged Out         Review of Systems  Constitutional, neuro, ENT, endocrine, pulmonary, cardiac, gastrointestinal, genitourinary, musculoskeletal, integument and psychiatric systems are negative, except as otherwise noted.     Objective    Exam  /80 (BP Location: Right arm, Patient Position: Sitting, Cuff Size: Adult Regular)   Pulse 74   Temp 98.8  F (37.1  C) (Oral)   Resp 16   Ht 1.638 m (5' 4.5\")   Wt 65.8 kg (145 lb)   LMP  (LMP Unknown)   SpO2 97%   BMI 24.50 kg/m     Estimated body mass index is 24.5 kg/m  as calculated from the following:    Height as of this encounter: 1.638 m (5' 4.5\").    Weight " as of this encounter: 65.8 kg (145 lb).    Physical Exam  GENERAL: alert and no distress  EYES: Eyes grossly normal to inspection, PERRL and conjunctivae and sclerae normal  HENT: ear canals and TM's normal, nose and mouth without ulcers or lesions  NECK: no adenopathy, no asymmetry, masses, or scars  RESP: lungs clear to auscultation - no rales, rhonchi or wheezes  BREAST: normal without masses, tenderness or nipple discharge and no palpable axillary masses or adenopathy  CV: regular rate and rhythm, normal S1 S2, no S3 or S4, no murmur, click or rub, no peripheral edema   ABDOMEN: soft, nontender, no hepatosplenomegaly, no masses and bowel sounds normal  MS: no gross musculoskeletal defects noted, no edema  SKIN: no suspicious lesions or rashes  NEURO: Normal strength and tone, mentation intact and speech normal  PSYCH: mentation appears normal, affect normal/bright        8/30/2024   Mini Cog   Clock Draw Score 2 Normal   3 Item Recall 1 object recalled   Mini Cog Total Score 3                 Signed Electronically by: Ramona Ann Aaseby-Aguilera, PA-C    Answers submitted by the patient for this visit:  Patient Health Questionnaire (Submitted on 8/30/2024)  If you checked off any problems, how difficult have these problems made it for you to do your work, take care of things at home, or get along with other people?: Not difficult at all  PHQ9 TOTAL SCORE: 0

## 2024-08-30 NOTE — PATIENT INSTRUCTIONS
Patient Education   Preventive Care Advice   This is general advice given by our system to help you stay healthy. However, your care team may have specific advice just for you. Please talk to your care team about your preventive care needs.  Nutrition  Eat 5 or more servings of fruits and vegetables each day.  Try wheat bread, brown rice and whole grain pasta (instead of white bread, rice, and pasta).  Get enough calcium and vitamin D. Check the label on foods and aim for 100% of the RDA (recommended daily allowance).  Lifestyle  Exercise at least 150 minutes each week  (30 minutes a day, 5 days a week).  Do muscle strengthening activities 2 days a week. These help control your weight and prevent disease.  No smoking.  Wear sunscreen to prevent skin cancer.  Have a dental exam and cleaning every 6 months.  Yearly exams  See your health care team every year to talk about:  Any changes in your health.  Any medicines your care team has prescribed.  Preventive care, family planning, and ways to prevent chronic diseases.  Shots (vaccines)   HPV shots (up to age 26), if you've never had them before.  Hepatitis B shots (up to age 59), if you've never had them before.  COVID-19 shot: Get this shot when it's due.  Flu shot: Get a flu shot every year.  Tetanus shot: Get a tetanus shot every 10 years.  Pneumococcal, hepatitis A, and RSV shots: Ask your care team if you need these based on your risk.  Shingles shot (for age 50 and up)  General health tests  Diabetes screening:  Starting at age 35, Get screened for diabetes at least every 3 years.  If you are younger than age 35, ask your care team if you should be screened for diabetes.  Cholesterol test: At age 39, start having a cholesterol test every 5 years, or more often if advised.  Bone density scan (DEXA): At age 50, ask your care team if you should have this scan for osteoporosis (brittle bones).  Hepatitis C: Get tested at least once in your life.  STIs (sexually  transmitted infections)  Before age 24: Ask your care team if you should be screened for STIs.  After age 24: Get screened for STIs if you're at risk. You are at risk for STIs (including HIV) if:  You are sexually active with more than one person.  You don't use condoms every time.  You or a partner was diagnosed with a sexually transmitted infection.  If you are at risk for HIV, ask about PrEP medicine to prevent HIV.  Get tested for HIV at least once in your life, whether you are at risk for HIV or not.  Cancer screening tests  Cervical cancer screening: If you have a cervix, begin getting regular cervical cancer screening tests starting at age 21.  Breast cancer scan (mammogram): If you've ever had breasts, begin having regular mammograms starting at age 40. This is a scan to check for breast cancer.  Colon cancer screening: It is important to start screening for colon cancer at age 45.  Have a colonoscopy test every 10 years (or more often if you're at risk) Or, ask your provider about stool tests like a FIT test every year or Cologuard test every 3 years.  To learn more about your testing options, visit:   .  For help making a decision, visit:   https://bit.ly/re11582.  Prostate cancer screening test: If you have a prostate, ask your care team if a prostate cancer screening test (PSA) at age 55 is right for you.  Lung cancer screening: If you are a current or former smoker ages 50 to 80, ask your care team if ongoing lung cancer screenings are right for you.  For informational purposes only. Not to replace the advice of your health care provider. Copyright   2023 [employer]. All rights reserved. Clinically reviewed by the [facility]. [employer] 434941 - REV 01/24.

## 2024-11-20 ENCOUNTER — TRANSFERRED RECORDS (OUTPATIENT)
Dept: MULTI SPECIALTY CLINIC | Facility: CLINIC | Age: 74
End: 2024-11-20

## 2024-11-26 ENCOUNTER — PATIENT OUTREACH (OUTPATIENT)
Dept: CARE COORDINATION | Facility: CLINIC | Age: 74
End: 2024-11-26
Payer: COMMERCIAL

## 2024-12-24 ENCOUNTER — PATIENT OUTREACH (OUTPATIENT)
Dept: CARE COORDINATION | Facility: CLINIC | Age: 74
End: 2024-12-24
Payer: COMMERCIAL

## 2025-03-01 ENCOUNTER — HEALTH MAINTENANCE LETTER (OUTPATIENT)
Age: 75
End: 2025-03-01

## 2025-04-09 ENCOUNTER — OFFICE VISIT (OUTPATIENT)
Dept: FAMILY MEDICINE | Facility: CLINIC | Age: 75
End: 2025-04-09
Payer: COMMERCIAL

## 2025-04-09 VITALS
HEART RATE: 75 BPM | SYSTOLIC BLOOD PRESSURE: 131 MMHG | TEMPERATURE: 97.8 F | RESPIRATION RATE: 20 BRPM | WEIGHT: 152.5 LBS | DIASTOLIC BLOOD PRESSURE: 80 MMHG | BODY MASS INDEX: 26.03 KG/M2 | HEIGHT: 64 IN | OXYGEN SATURATION: 95 %

## 2025-04-09 DIAGNOSIS — Z12.31 VISIT FOR SCREENING MAMMOGRAM: Primary | ICD-10-CM

## 2025-04-09 DIAGNOSIS — S29.9XXD INJURY OF CHEST WALL, SUBSEQUENT ENCOUNTER: ICD-10-CM

## 2025-04-09 DIAGNOSIS — F41.9 ANXIETY: ICD-10-CM

## 2025-04-09 DIAGNOSIS — F33.8 SEASONAL AFFECTIVE DISORDER: ICD-10-CM

## 2025-04-09 PROCEDURE — 3075F SYST BP GE 130 - 139MM HG: CPT | Performed by: PHYSICIAN ASSISTANT

## 2025-04-09 PROCEDURE — 99214 OFFICE O/P EST MOD 30 MIN: CPT | Performed by: PHYSICIAN ASSISTANT

## 2025-04-09 PROCEDURE — 1126F AMNT PAIN NOTED NONE PRSNT: CPT | Performed by: PHYSICIAN ASSISTANT

## 2025-04-09 PROCEDURE — 3079F DIAST BP 80-89 MM HG: CPT | Performed by: PHYSICIAN ASSISTANT

## 2025-04-09 RX ORDER — BUPROPION HYDROCHLORIDE 150 MG/1
150 TABLET ORAL EVERY MORNING
Qty: 90 TABLET | Refills: 3 | Status: SHIPPED | OUTPATIENT
Start: 2025-04-09

## 2025-04-09 RX ORDER — ESCITALOPRAM OXALATE 10 MG/1
10 TABLET ORAL DAILY
Qty: 90 TABLET | Refills: 3 | Status: SHIPPED | OUTPATIENT
Start: 2025-04-09

## 2025-04-09 ASSESSMENT — ANXIETY QUESTIONNAIRES
IF YOU CHECKED OFF ANY PROBLEMS ON THIS QUESTIONNAIRE, HOW DIFFICULT HAVE THESE PROBLEMS MADE IT FOR YOU TO DO YOUR WORK, TAKE CARE OF THINGS AT HOME, OR GET ALONG WITH OTHER PEOPLE: SOMEWHAT DIFFICULT
2. NOT BEING ABLE TO STOP OR CONTROL WORRYING: SEVERAL DAYS
GAD7 TOTAL SCORE: 7
6. BECOMING EASILY ANNOYED OR IRRITABLE: SEVERAL DAYS
1. FEELING NERVOUS, ANXIOUS, OR ON EDGE: SEVERAL DAYS
3. WORRYING TOO MUCH ABOUT DIFFERENT THINGS: SEVERAL DAYS
GAD7 TOTAL SCORE: 7
5. BEING SO RESTLESS THAT IT IS HARD TO SIT STILL: SEVERAL DAYS
7. FEELING AFRAID AS IF SOMETHING AWFUL MIGHT HAPPEN: SEVERAL DAYS

## 2025-04-09 ASSESSMENT — PATIENT HEALTH QUESTIONNAIRE - PHQ9
SUM OF ALL RESPONSES TO PHQ QUESTIONS 1-9: 0
SUM OF ALL RESPONSES TO PHQ QUESTIONS 1-9: 0
10. IF YOU CHECKED OFF ANY PROBLEMS, HOW DIFFICULT HAVE THESE PROBLEMS MADE IT FOR YOU TO DO YOUR WORK, TAKE CARE OF THINGS AT HOME, OR GET ALONG WITH OTHER PEOPLE: NOT DIFFICULT AT ALL
5. POOR APPETITE OR OVEREATING: SEVERAL DAYS

## 2025-04-09 ASSESSMENT — PAIN SCALES - GENERAL: PAINLEVEL_OUTOF10: NO PAIN (0)

## 2025-04-09 NOTE — PROGRESS NOTES
"  Assessment & Plan     Anxiety  Will add lexapro 10 mg.  Will start taking in am, 1/2 tablets for 4-6 days until no side effects noted then increase to whole tab and follow-up if this fails to improve symptoms.     - escitalopram (LEXAPRO) 10 MG tablet; Take 1 tablet (10 mg) by mouth daily.    Seasonal affective disorder    - buPROPion (WELLBUTRIN XL) 150 MG 24 hr tablet; Take 1 tablet (150 mg) by mouth every morning.    Injury of chest wall, subsequent encounter  Will get a chest ct and determine plan .    - CT Chest w/o Contrast; Future    Visit for screening mammogram    - MA Screen Bilateral w/[place]; Future          BMI  Estimated body mass index is 26.38 kg/m  as calculated from the following:    Height as of this encounter: 1.619 m (5' 3.75\").    Weight as of this encounter: 69.2 kg (152 lb 8 oz).             Kamla Cisneros is a 74 year old, presenting for the following health issues:  RECHECK (Fell on left side of chest about 2 years ago, had CT done, occasionally hurt when takes deep breath not all the time, sx started in January, also daughter has health issue she wonders if that effects it. Right ear pain )      Daughter diagnosed with end stage ovarian cancer and this is causing increased anxiety and low moods.  Has been good on wellbutrin 150 mg up until recently.       Also, she has additional complaints of she injured her LUQ chest wall 2 years ago when she tipped in the dark and landed on her flashlight.  This area continues to hurt. Affected the way she sleeps and certain movements. .            4/9/2025    12:46 PM   Additional Questions   Roomed by Aldo   Accompanied by self     Via the Health Maintenance questionnaire, the patient has reported the following services have been completed -Mammogram: l dont remenber 2024-11-20, this information has been sent to the abstraction team.  History of Present Illness       Reason for visit:  Chestpain  Symptoms include:  One year   She is taking " "medications regularly.        Patient identified using two patient identifiers.  Ear exam showing wax occlusion completed by provider.  H202/H20 was placed in the right ear(s) via irrigation tool: elephant ear.                Review of Systems  Constitutional, HEENT, cardiovascular, pulmonary, GI, , musculoskeletal, neuro, skin, endocrine and psych systems are negative, except as otherwise noted.      Objective    /80 (BP Location: Right arm, Patient Position: Sitting, Cuff Size: Adult Regular)   Pulse 75   Temp 97.8  F (36.6  C) (Oral)   Resp 20   Ht 1.619 m (5' 3.75\")   Wt 69.2 kg (152 lb 8 oz)   LMP  (LMP Unknown)   SpO2 95%   BMI 26.38 kg/m    Body mass index is 26.38 kg/m .  Physical Exam   GENERAL: alert and no distress  HENT: ear canals and TM's normal, nose and mouth without ulcers or lesions  NECK: no adenopathy, no asymmetry, masses, or scars  RESP: lungs clear to auscultation - no rales, rhonchi or wheezes  CV: regular rate and rhythm, normal S1 S2, no S3 or S4, no murmur, click or rub, no peripheral edema   ABDOMEN: soft, nontender, no hepatosplenomegaly, no masses and bowel sounds normal  MS: no gross musculoskeletal defects noted, no edema  PSYCH: mentation appears normal, affect normal/bright            Signed Electronically by: Ramona Ann Aaseby-Aguilera, PA-C    " Patient requests all Lab, Cardiology, and Radiology Results on their Discharge Instructions

## 2025-04-10 ENCOUNTER — PATIENT OUTREACH (OUTPATIENT)
Dept: CARE COORDINATION | Facility: CLINIC | Age: 75
End: 2025-04-10
Payer: COMMERCIAL

## 2025-04-10 ASSESSMENT — PATIENT HEALTH QUESTIONNAIRE - PHQ9: SUM OF ALL RESPONSES TO PHQ QUESTIONS 1-9: 6

## 2025-04-28 ENCOUNTER — ANCILLARY PROCEDURE (OUTPATIENT)
Dept: MAMMOGRAPHY | Facility: CLINIC | Age: 75
End: 2025-04-28
Payer: COMMERCIAL

## 2025-04-28 DIAGNOSIS — Z12.31 VISIT FOR SCREENING MAMMOGRAM: ICD-10-CM

## 2025-04-28 PROCEDURE — 77063 BREAST TOMOSYNTHESIS BI: CPT | Mod: TC | Performed by: RADIOLOGY

## 2025-04-28 PROCEDURE — 77067 SCR MAMMO BI INCL CAD: CPT | Mod: TC | Performed by: RADIOLOGY

## 2025-06-11 ENCOUNTER — TRANSFERRED RECORDS (OUTPATIENT)
Dept: HEALTH INFORMATION MANAGEMENT | Facility: CLINIC | Age: 75
End: 2025-06-11
Payer: COMMERCIAL

## 2025-07-12 ENCOUNTER — HOSPITAL ENCOUNTER (OUTPATIENT)
Dept: CT IMAGING | Facility: CLINIC | Age: 75
Discharge: HOME | End: 2025-07-12
Attending: PHYSICIAN ASSISTANT | Admitting: PHYSICIAN ASSISTANT
Payer: COMMERCIAL

## 2025-07-12 DIAGNOSIS — S29.9XXD INJURY OF CHEST WALL, SUBSEQUENT ENCOUNTER: ICD-10-CM

## 2025-07-12 PROCEDURE — 71250 CT THORAX DX C-: CPT

## 2025-07-31 ENCOUNTER — PATIENT OUTREACH (OUTPATIENT)
Dept: CARE COORDINATION | Facility: CLINIC | Age: 75
End: 2025-07-31
Payer: COMMERCIAL

## 2025-08-14 ENCOUNTER — PATIENT OUTREACH (OUTPATIENT)
Dept: CARE COORDINATION | Facility: CLINIC | Age: 75
End: 2025-08-14
Payer: COMMERCIAL